# Patient Record
Sex: FEMALE | Race: OTHER | Employment: PART TIME | URBAN - METROPOLITAN AREA
[De-identification: names, ages, dates, MRNs, and addresses within clinical notes are randomized per-mention and may not be internally consistent; named-entity substitution may affect disease eponyms.]

---

## 2018-04-25 ENCOUNTER — HOSPITAL ENCOUNTER (EMERGENCY)
Age: 53
Discharge: HOME | End: 2018-04-26
Attending: EMERGENCY MEDICINE
Payer: MEDICAID

## 2018-04-25 ENCOUNTER — APPOINTMENT (OUTPATIENT)
Dept: GENERAL RADIOLOGY | Age: 53
End: 2018-04-25
Attending: EMERGENCY MEDICINE
Payer: MEDICAID

## 2018-04-25 DIAGNOSIS — M54.41 ACUTE RIGHT-SIDED LOW BACK PAIN WITH RIGHT-SIDED SCIATICA: Primary | ICD-10-CM

## 2018-04-25 PROCEDURE — 99284 EMERGENCY DEPT VISIT MOD MDM: CPT | Performed by: EMERGENCY MEDICINE

## 2018-04-25 PROCEDURE — 81003 URINALYSIS AUTO W/O SCOPE: CPT | Performed by: EMERGENCY MEDICINE

## 2018-04-25 PROCEDURE — 72100 X-RAY EXAM L-S SPINE 2/3 VWS: CPT

## 2018-04-25 RX ORDER — IBUPROFEN 800 MG/1
800 TABLET ORAL
Status: COMPLETED | OUTPATIENT
Start: 2018-04-25 | End: 2018-04-26

## 2018-04-25 NOTE — LETTER
[id number] [facility] EMERGENCY DEPT One [address] [id number] 
[phone] Work/School Note Date: 4/25/2018 To Whom It May concern: 
 
Jo Ann Allison was seen and treated today in the emergency room by the following provider(s): 
Attending Provider: Guerrero Harden MD. Jo Ann Allison Return to school/sport/work: 4/27/2018 Sincerely, Ana Maria Navarro RN

## 2018-04-26 VITALS
SYSTOLIC BLOOD PRESSURE: 124 MMHG | HEIGHT: 64 IN | RESPIRATION RATE: 18 BRPM | WEIGHT: 170 LBS | TEMPERATURE: 98.2 F | OXYGEN SATURATION: 97 % | DIASTOLIC BLOOD PRESSURE: 82 MMHG | HEART RATE: 68 BPM | BODY MASS INDEX: 29.02 KG/M2

## 2018-04-26 PROCEDURE — 74011250637 HC RX REV CODE- 250/637: Performed by: EMERGENCY MEDICINE

## 2018-04-26 RX ORDER — METHOCARBAMOL 500 MG/1
500 TABLET, FILM COATED ORAL
Qty: 10 TAB | Refills: 0 | Status: SHIPPED | OUTPATIENT
Start: 2018-04-26 | End: 2018-05-01

## 2018-04-26 RX ORDER — PREDNISONE 20 MG/1
60 TABLET ORAL DAILY
Qty: 12 TAB | Refills: 0 | Status: SHIPPED | OUTPATIENT
Start: 2018-04-26 | End: 2018-04-30

## 2018-04-26 RX ADMIN — IBUPROFEN 800 MG: 800 TABLET ORAL at 00:00

## 2018-04-26 NOTE — DISCHARGE INSTRUCTIONS
Aprenda sobre cómo tener kori espalda saludable - [ Learning About How to Have a Healthy Back ]  ¿Qué causa el dolor de espalda? El dolor de espalda a menudo es causado por uso excesivo, distensión o lesión. Por ejemplo, las personas frecuentemente se lastiman la espalda al practicar deportes o trabajar en el danieldín, al ser sacudidas en un accidente automovilístico o al levantar algo demasiado pesado. El envejecimiento también desempeña un papel. [place] y los músculos tienden a perder fuerza a medida que envejece, lo cual aumenta las probabilidades de [facility] Albino Demetris. Los discos [employer] huesos de la columna vertebral (vértebras) podrían tener desgaste y ya no proporcionar suficiente amortiguamiento entre los [place]. Un disco que sobresale o que se rompe (hernia de disco) puede presionar sobre los nervios, causando dolor de [place]. En Mirant, el dolor de espalda es el resultado de la artritis, de vértebras rotas debido a la pérdida de [employer] (osteoporosis), de kori enfermedad o de un problema de la columna vertebral.  Aunque la mayoría de las personas tienen dolor de espalda en un momento u otro, hay medidas que se pueden ward para reducir la probabilidad de sufrirlo. ¿Cómo puede tener kori espalda saludable? Reduzca la tensión en la espalda mediante kori buena postura  El desplomarse o encorvarse por sí solo caitlyn vez no cause dolor en la parte baja de la espalda. Jorge kori vez que la espalda se ha distendido o lesionado, la tom postura puede empeorar el dolor. · [place] en kori posición que mantenga las curvas naturales de la espalda y en un colchón cómodo. Duerma de lado con kori almohada entre las rodillas o boca arriba con kori almohada debajo de las rodillas. Estas posiciones pueden reducir la tensión en la espalda. · Manténgase erguido cuando esté de pie o sentado.  Tener kori \"buena postura\" por lo general significa que las [place], los hombros y las caderas están en Mark Bur recta.  · Si debe permanecer de pie mucho tiempo, ponga un pie sobre un taburete, un bordillo o Delmus Lockwood. Cambie de pie cada cierto tiempo. · Siéntese en kori silla que sea lo suficientemente baja maryellen para poner ambos pies en el suelo con las rodillas más o menos al nivel de la cadera. Si barragan silla o barragan escritorio son Agustina Coke, utilice un reposapiés con el fin de elevar las rodillas. Colóquese kori almohada pequeña, kori toalla enrollada o un rollo lumbar en la curva de la espalda si necesita más apoyo. · Pruebe usar kori silla ergonómica con apoyo para las rodillas (\"kneeling chair\"), pues ayuda a inclinar las caderas hacia sherrie. [place] le reduce la presión en la parte baja de la espalda. · Intente sentarse sobre kori pelota de ejercicio. Puede balancearse de lado a lado, lo que ayuda a mantener la espalda relajada. · Al conducir, [place]if[place]'s las rodillas adelso al nivel de las caderas. Siéntese derecho y conduzca con ambas imelda sobre el volante. Los brazos deben estar levemente flexionados. Reduzca la tensión en la espalda levantando objetos con cuidado  · Agáchese doblando solo la cadera y las rodillas. Si es necesario, ponga kori rodilla en el suelo y extienda la otra rodilla frente a usted en ángulo recto (genuflexión). · Empuje el pecho hacia adelante. [place] le ayuda a mantener la parte superior de la espalda derecha mientras mantiene un arco ligero en la parte baja. · Sostenga la carga tan cerca del cuerpo maryellen sea posible, a la altura del ombligo. · Utilice los pies para cambiar de dirección con pasos cortos. · Dirija con las caderas al cambiar de dirección. Mantenga los hombros en línea con las caderas [place] se desplaza. · Asiente la carga con cuidado, acuclillándose únicamente con las rodillas y las caderas. Ejercite y estire la espalda  · [place] algo de ejercicio la mayoría de los días de la [place], si barragan médico lo aprueba. Puede caminar, correr, nadar o montar en bicicleta.   · [employer] músculos de la espalda. Los siguientes son algunos ejercicios que puede probar:  ¨ Acuéstese de espalda y lleve suavemente kori rodilla flexionada hacia el pecho. Vuelva a poner emilia pie en el suelo y luego cuco lo mismo con la otra rodilla. ¨ Cuco inclinaciones de pelvis. Acuéstese de espalda con las rodillas flexionadas. Contraiga los músculos abdominales. Hunda el ombligo hacia adentro y [place], en dirección a las costillas. Deberá sentir maryellen si la espalda estuviera ejerciendo presión sobre el suelo y que las caderas y la pelvis se despegan levemente del suelo. Mantenga la posición isabel 6 segundos mientras respira de Wild[place] pausada. ¨ Siéntese con la espalda contra la pared. · Mantenga amauri los músculos del tronco. Los músculos de la espalda, el abdomen y los glúteos sostienen la columna vertebral.  ¨ Hunda el abdomen e imagine que está llevando el ombligo hacia la columna. Mantenga la posición isabel 6 segundos y luego relájese. Recuerde seguir respirando de manera normal [employer]. ¨ Cuco abdominales. Hágalos siempre con las rodillas dobladas. Mantenga la parte baja de la espalda sobre el suelo y [employer] hombros hacia las rodillas con un movimiento lento y uniforme. Mantenga los brazos cruzados sobre el pecho. Si esto le causa molestias en el henry, pruebe a poner las imelda detrás del henry (no de la onel), con los codos abiertos. ¨ Acuéstese boca arriba con las rodillas flexionadas y los pies apoyados sobre el suelo. Contraiga los músculos abdominales y luego empuje con los pies y eleve las nalgas algunas pulgadas. Mantenga la posición isabel 6 segundos mientras continúa respirando de Wild[place] normal y luego vuelva a bajar lentamente hacia el suelo. Repita de 8 a 12 veces. ¨ Si le gusta el ejercicio en christi, pruebe con Pilates o yoga.  Estas clases tienen posiciones que fortalecen los músculos del tronco.  Lleve un estilo de mayra saludable  · Mantenga un peso saludable para evitar sobrecargar la espalda. · No fume. Fumar aumenta el riesgo de osteoporosis, que debilita la columna vertebral. Si necesita ayuda para dejar de fumar, hable con barragan médico sobre programas y medicamentos para dejar de fumar. Estos pueden aumentar shivani probabilidades de dejar el hábito para siempre. ¿Dónde puede encontrar más información en inglés? Tracie Sterling a http://mitul-fauzia.info/. Escriba L315 en la búsqueda para aprender más acerca de \"Aprenda sobre cómo tener kori espalda saludable - [ Learning About How to Have a Healthy Back ]. \"  Revisado: 21 marzo, 2017  Versión del contenido: 11.4  © 1818-1431 Healthwise, Incorporated. Las instrucciones de cuidado fueron adaptadas bajo licencia por Good [employer] Connections (which disclaims liability or warranty for this information). Si usted tiene [place] afección médica o sobre estas instrucciones, siempre pregunte a barragan profesional de amanda. Healthwise, Incorporated niega toda garantía o responsabilidad por barragan uso de esta información. Dolor de espalda: Instrucciones de cuidado - [ Back Pain: Care Instructions ]  Instrucciones de cuidado    El dolor de espalda tiene muchas causas posibles. Con frecuencia, está relacionado con problemas en los músculos y ligamentos de la espalda. También podría estar relacionado con problemas de los nervios, discos o huesos de la espalda. Moverse, levantar algo, ponerse de pie, sentarse o dormir de [employer] incómoda pueden forzar la espalda. Algunas veces no se da cuenta de que tiene kori lesión Rohm and Aguilar tarde. La artritis es otra causa común del dolor de espalda. Aunque caitlyn vez duela mucho, el dolor de espalda por lo general mejora por sí mismo en varias semanas. [address] personas se recuperan en 12 semanas o menos. Hacer un buen tratamiento en el hogar y tener cuidado de no forzar la espalda puede ayudar a que se sienta mejor más pronto.   Dalia Terrell de seguimiento es kori parte clave de barragan tratamiento y seguridad. Asegúrese de hacer y acudir a todas las citas, y llame a barragan médico si está teniendo problemas. También es kori buena idea saber los resultados de los exámenes y mantener kori lista de los medicamentos que avelino. ¿Cómo puede cuidarse en el hogar? · Siéntese o acuéstese en las posiciones más cómodas y que reduzcan el dolor. Trate kori de estas posiciones al acostarse:  ¨ Acuéstese boca arriba con las rodillas dobladas y apoyadas sobre almohadones grandes. ¨ Acuéstese en el piso con las piernas sobre el asiento de un sofá o kori silla. ¨ Acuéstese de lado con las rodillas y caderas dobladas y [place] entre las piernas. ¨ Acuéstese boca abajo siempre que esta posición no empeore el dolor. · No se siente en la cama y evite los sofás blandos y las posiciones torcidas. El reposo en cama puede aliviar el dolor al principio, ciro retrasa la sanación. Evite reposar en la cama después del primer día de dolor de espalda. · Cambie de posición cada 30 minutos. Si debe sentarse por [employer], párese y descanse de estar sentado. Levántese y camine, o acuéstese en kori posición cómoda. · Pruebe usar kori almohadilla térmica a temperatura baja o mediana isabel 15 a 20 minutos cada 2 ó 3 horas. Pruebe kori ducha tibia en vez de kori sesión con la almohadilla térmica. · También puede probar kori compresa de hielo isabel 10 a 15 minutos cada 2 a 3 horas. Póngase un paño wall entre la compresa de hielo y la piel. · [employer] analgésicos (medicamentos para el dolor) exactamente según las indicaciones. ¨ Si el médico le recetó un analgésico, tómelo según las indicaciones. ¨ Si no está tomando un analgésico recetado, pregúntele a barragan médico si puede ward jena de [employer]. · Cuco caminatas cortas varias veces al día. Usted puede comenzar con 5 a 10 minutos, 3 ó 4 veces al día, y aumentar progresivamente hasta lograr caminatas más largas.  Camine en superficies elkin y evite colinas y escaleras hasta que la espalda esté mejor. · Regrese al [employer] y otras actividades lo más pronto posible. El descanso continuo sin actividad por lo general no es [place] para la espalda. · Para prevenir el dolor de espalda en el futuro, veronica ejercicios para estirar y fortalecer la espalda y el abdomen. Aprenda a Time Owens, técnicas seguras para levantar peso y la mecánica corporal apropiada. ¿Cuándo debe pedir ayuda? Llame a barragan médico ahora mismo o busque atención médica inmediata si:  ? · Tiene un nuevo entumecimiento en [place]. ? · Tiene un nuevo debilitamiento en [place]. ([place] puede hacer que sea difícil ponerse de pie). ? · Pierde el control de la vejiga o los intestinos. ?Preste especial atención a los cambios en barragan amanda y asegúrese de comunicarse con barragan médico si:  ? · El dolor [facility]. ? · No está mejorando después de 2 semanas. ¿Dónde puede encontrar más información en inglés? Jovita Mgcuire a http://mitul-fauzia.info/. Marcial [facility] F407 en la búsqueda para aprender Edwar Mary de \"Dolor de espalda: Instrucciones de cuidado - [ Back Pain: Care Instructions ]. \"  Revisado: 21 marzo, 2017  Versión del contenido: 11.4  © 0599-1349 Healthwise, Incorporated. Las instrucciones de cuidado fueron adaptadas bajo licencia por Good Help Connections (which disclaims liability or warranty for this information). Si usted tiene [place] afección médica o sobre estas instrucciones, siempre pregunte a barragan profesional de amanda. Healthwise, Incorporated niega toda garantía o responsabilidad por barragan uso de esta información. Aprenda sobre el alivio del dolor de espalda - [ Learning About Relief for Back Pain ]  ¿Qué son la tensión y la distensión en la espalda? La distensión en la espalda ocurre cuando usted estira [place], o fuerza, un músculo de la espalda.  Usted puede lesionarse la espalda en un accidente o cuando hace ejercicio o Almeta Abhishek algo.  La mayoría de los rodo de espalda mejoran con reposo y con [place]. Usted puede cuidarse en el hogar para ayudar a que barragan espalda sane. ¿Qué es lo saad que puede hacer para aliviar el dolor de espalda? Cuando empiece a sentir dolor de espalda, siga estos pasos:  · Camine. Dé un paseo corto (10 a 20 minutos) sobre kori superficie plana (sin pendientes, donde no haya colinas o escaleras) cada 2 o 3 horas. Solo camine distancias que pueda manejar sin dolor, especialmente dolor en las piernas. · Relájese. Encuentre kori posición cómoda para descansar. Algunas personas se sienten cómodas en el suelo o en kori cama de firmeza media con kori almohada pequeña debajo de la onel y otra debajo de las rodillas. Otras prefieren acostarse de lado con kori almohada entre las rodillas. No permanezca en kori posición por [employer]. · Pruebe con calor o hielo. Trate de [employer] almohadilla térmica a baja o media temperatura, o tome kori ducha tibia de 15 o 20 minutos cada 2 o 3 horas. O puede comprar vendas térmicas desechables que se usan kori maria teresa vez por hasta 8 horas. También puede probar compresas de hielo entre 10 y 15 minutos cada 2 o 3 horas. Puede usar kori compresa de hielo o kori bolsa de verduras congeladas envuelta en kori toalla delgada. No existe evidencia sólida de que el calor o el hielo ayuden, ciro puede probarlos para bhavin si son de [place]. También podría convenirle probar alternar [employer] frío y el calor. · [employer] analgésicos (medicamentos para el dolor) exactamente según las indicaciones. ¨ Si el médico le recetó un analgésico, tómelo según las indicaciones. ¨ Si no está tomando un analgésico recetado, pregúntele a barragan médico si puede ward jena de [employer]. ¿Qué más puede hacer? · Lucrezia Lager estiramientos y ejercicio. Los ejercicios que incrementan la flexibilidad pueden aliviar el dolor y facilitar que los músculos mantengan a la columna vertebral en kori buena posición neutra.  Y no se olvide de seguir caminando. · Hágase masajes usted mismo. Usted puede darse masaje para relajarse después del trabajo o de la escuela o para sentirse lleno de energía en la mañana. No es difícil [place] [employer], las imelda o el henry. El darse masaje usted mismo funciona mejor si está con ropa cómoda y sentado o [place] en kori posición cómoda. Utilice aceite o loción para masajearse la piel directamente. · Reduzca el estrés. El dolor de espalda puede llevar a un círculo leonidas: La angustia por el dolor tensa los músculos en la espalda, lo que a barragan vez causa más dolor. Aprenda a relajar la mente y los músculos para disminuir el estrés. ¿Dónde puede encontrar más información en inglés? Rosaura Wideman a http://mitulPeatix.info/. Kalpana Rogers K343 en la búsqueda para aprender más acerca de \"Aprenda sobre el [place] del dolor de espalda - [ Learning About Relief for Back Pain ]. \"  Revisado: 21 marzo, 2017  Versión del contenido: 11.4  © 6876-4970 Healthwise, Incorporated. Las instrucciones de cuidado fueron adaptadas bajo licencia por Good Help Connections (which disclaims liability or warranty for this information). Si usted tiene [place] afección médica o sobre estas instrucciones, siempre pregunte a barragan profesional de amanda. Healthwise, Incorporated niega toda garantía o responsabilidad por barragan uso de esta información. Dolor de espalda, síntomas urgentes o de emergencia: Instrucciones de cuidado - [ Back Pain, Emergency or Urgent Symptoms: Care Instructions ]  Instrucciones de cuidado    Muchas personas tienen dolor de espalda en algún momento. En la [employer], el dolor mejora con los cuidados personales, lo que incluye analgésicos (medicamentos para el dolor) de [employer], hielo, calor y ejercicios.   A menos que tenga síntomas de kori lesión grave o de ataque al corazón, es posible que pueda dejar pasar algunos días antes de llamar al médico. Jorge algunos problemas de espalda son Deneise Chatters graves. No ignore los síntomas que deberían ser revisados de inmediato. La atención de seguimiento es kori parte clave de barragan tratamiento y seguridad. Asegúrese de hacer y acudir a todas las citas, y llame a barragan médico si está teniendo problemas. También es kori buena idea saber los resultados de los exámenes y mantener kori lista de los medicamentos que avelino. ¿Cómo puede cuidarse en el hogar? · Siéntese o acuéstese en las posiciones más cómodas y que reduzcan el dolor. Pruebe kori de estas posiciones cuando se acueste:  ¨ Acuéstese boca arriba con las rodillas dobladas y apoyadas sobre [address]. ¨ Acuéstese en el piso con las piernas sobre el asiento de un sofá o kori silla. ¨ Acuéstese de lado con las rodillas [address] las caderas y [place] las piernas. ¨ Acuéstese boca abajo siempre que esta posición no empeore el dolor. · No se siente sobre la cama y evite los sillones blandos, así maryellen las posiciones torcidas. El reposo en cama puede aliviar el dolor al principio, ciro retrasa la sanación. Evite reposar en la cama después del primer día. · Cambie de posición cada 30 minutos. Si debe sentarse por [employer], párese y descanse de estar sentado. Levántese y camine, o acuéstese en posición horizontal.  · Pruebe a usar kori almohadilla térmica a temperatura baja o mediana de 15 a 20 minutos cada 2 o 3 horas. Pruebe con [place] ducha tibia en vez de kori sesión con la almohadilla térmica. También puede comprar envolturas calientes (\"heat wraps\") desechables que aragon hasta 8 horas. También puede tratar de colocarse hielo o compresas frías en la espalda de 10 a 20 minutos cada vez, varias veces al día. (Póngase un paño wall entre el hielo y la piel). [place] reduce el dolor y le será más fácil mantenerse activo y hacer ejercicio. · [employer] analgésicos exactamente según las indicaciones. ¨ Si el médico le recetó analgésicos, tómelos según las indicaciones.   ¨ Si no está tomando un analgésico recetado, pregúntele a barragan médico si puede ward jena de [employer]. ¿Cuándo debe pedir ayuda? Llame al 911 en cualquier momento que considere que necesita atención de [place]. Por ejemplo, llame si:  ? · Es completamente incapaz de  kori pierna. ? · Tiene dolor de espalda junto con dolor abdominal intenso. ? · Tiene síntomas de un ataque al corazón. Estos pueden incluir:  ¨ Dolor u opresión en el pecho, o kori sensación extraña en el pecho. ¨ Sudoración. ¨ Falta de aire. ¨ Náuseas o vómito. ¨ Dolor, opresión o kori sensación extraña en la espalda, el henry, la mandíbula o la parte superior del abdomen, o en jena o ambos hombros o brazos. ¨ Aturdimiento o debilidad repentina. ¨ Latidos del corazón rápidos o irregulares. Después de que llame al 911, el operador puede decirle que [place] [address] aspirina para adultos o de 2 a 4 aspirinas de dosis baja. Espere kori ambulancia. No trate de conducir usted mismo. ? Llame a barragan médico ahora mismo o busque atención médica inmediata si:  ? · Tiene síntomas nuevos o peores en los brazos, las piernas, el pecho, el abdomen o las nalgas (glúteos). Los síntomas pueden incluir:  ¨ Entumecimiento u hormigueo. ¨ Debilidad. ¨ Dolor. ? · Pierde el control de la vejiga o del intestino. ? · Tiene dolor de espalda y:  ¨ Se ha lesionado la espalda al levantar o al hacer alguna [place]. Llame si el dolor es intenso, no ha desaparecido después de 1 o 2 días y no puede hacer shivani actividades normales diarias. ¨ Anteriormente ha tenido kori lesión en la espalda que necesitó tratamiento. ¨ El dolor ha durado más de 4 [facility]. ¨ Dykes perdido peso de [place] inexplicable. ¨ Tiene [address]. ¨ Tiene o tuvo cáncer. ?Preste especial atención a los cambios en barragan amanda y asegúrese de comunicarse con barragan médico si no mejora maryellen se esperaba. ¿Dónde puede encontrar más información en inglés? Kasandra Deutsch a http://mitul-fauzia.info/.   Rosey Kerr U199 en la búsqueda para aprender más acerca de \"Dolor de espalda, síntomas urgentes o de emergencia: Instrucciones de cuidado - [ Back Pain, Emergency or Urgent Symptoms: Care Instructions ]. \"  Revisado: 20 Yuliya Briggs 2017  Versión del contenido: 11.4  © 0119-9718 Healthwise, Incorporated. Las instrucciones de cuidado fueron adaptadas bajo licencia por Good Help Connections (which disclaims liability or warranty for this information). Si usted tiene [place] afección médica o sobre estas instrucciones, siempre pregunte a barragan profesional de amanda. Healthwise, Incorporated niega toda garantía o responsabilidad por barragan uso de esta información. Dolor regulo en la parte baja de la espalda: Ejercicios - [ Acute Low Back Pain: Exercises ]  Instrucciones de cuidado  Éstos son algunos ejemplos de ejercicios típicos de rehabilitación para barragan afección. Comience cada ejercicio lentamente. Reduzca la intensidad del ejercicio si Lorrain Camera a sentir dolor. Barragan médico o el fisioterapeuta le dirán cuándo puede comenzar con estos ejercicios y cuáles funcionarán mejor para usted. Cuando no esté activo, encuentre kori posición cómoda para descansar. Algunas personas se sienten cómodas en el piso o en kori cama de firmeza media con kori almohada pequeña debajo de la onel y otra debajo de shivani rodillas. Otras personas prefieren acostarse de lado con kori almohada entre las rodillas. No permanezca en kori posición por [facility]. Dé paseos cortos (10 a 20 minutos) cada 2 a 3 horas. Evite las pendientes, las colinas y las escaleras hasta que se sienta mejor. Sólo camine distancias que pueda manejar sin dolor, especialmente dolor en las piernas. Cómo se hacen los ejercicios  Estiramientos de la espalda    1. [address] y las rodillas en el piso. 2. Relaje la onel y [facility]. Arquee la espalda hacia el techo, hasta que sienta que las partes yazmin, media y baja se estiran agradablemente.  Mantenga xenia estiramiento isabel el tiempo que se sienta cómodo, o de 15 a 30 segundos. 3. Vuelva a la posición inicial con la espalda plana mientras está apoyado de imelda y rodillas. 4. Deje que barragan espalda se nivele empujando el [place]. [address] (glúteos) hacia el techo. 5. Mantenga esta posición isabel 15 a 30 segundos. 6. Repita de 2 a 4 veces. La atención de seguimiento es kori parte clave de barragan tratamiento y seguridad. Asegúrese de hacer y acudir a todas las citas, y llame a barragan médico si está teniendo problemas. También es kori buena idea saber los resultados de los exámenes y mantener kori lista de los medicamentos que avelino. ¿Dónde puede encontrar más información en inglés? Mendez Silva a http://mitul-fauzia.info/. [facility] Tai R461 en la búsqueda para aprender más acerca de \"Dolor regulo en la parte baja de la espalda: Ejercicios - [ Acute Low Back Pain: Exercises ]. \"  Revisado: 21 marzo, 2017  Versión del contenido: 11.4  © 1930-7853 Healthwise, Incorporated. Las instrucciones de cuidado fueron adaptadas bajo licencia por Good [facility] Connections (which disclaims liability or warranty for this information). Si [facility] tiene [place] afección médica o sobre estas instrucciones, siempre pregunte a barragan profesional de amanda. Healthwise, Incorporated niega toda garantía o responsabilidad por barragan uso de esta información.

## 2018-04-26 NOTE — ED NOTES
I have reviewed discharge instructions with the patient. The patient verbalized understanding. Patient left ED via Discharge Method: ambulatory to Home with son Ruben Jade. Opportunity for questions and clarification provided. Patient given 2 scripts. To continue your aftercare when you leave the hospital, you may receive an automated call from our care team to check in on how you are doing. This is a free service and part of our promise to provide the best care and service to meet your aftercare needs.  If you have questions, or wish to unsubscribe from this service please call [phone]. Thank you for Choosing our [facility] Emergency Department.

## 2018-04-26 NOTE — ED TRIAGE NOTES
Pt arrived via POV with c/o LBP into Rt leg x 2 weeks, denies trauma; new onset. Came in tonight d/t unbearable pain.

## 2018-04-26 NOTE — ED PROVIDER NOTES
HPI Comments: 70-year-old female presented with complaint of right-sided lower back pain with radiation down her right leg has been present over the course of the past 2 weeks. States the pain is shooting in nature. Patient denies any recent trauma or injury. Denies numbness, tingling, trouble walking, fever, chills, IVDU, hx of malignancy, bowel or bladder incontinence, weakness. States that she attempted to smoke marijuana this evening to help the pain. Patient is a 47 y.o. female presenting with back pain. The history is provided by the patient. The history is limited by a language barrier. A  was used (Offered Language line or other interpretor. Family and patient states they would rather interpret. ). Back Pain    This is a new problem. The current episode started more than 2 days ago. The problem has not changed since onset. The problem occurs daily. Patient reports not work related injury. The pain is associated with no known injury. The pain is present in the lumbar spine. The quality of the pain is described as shooting. Radiates to: RLE. The pain is at a severity of 2/10. The pain is mild. The pain is the same all the time. Pertinent negatives include no chest pain, no fever, no numbness, no weight loss, no headaches, no abdominal pain, no abdominal swelling, no bowel incontinence, no perianal numbness, no bladder incontinence, no dysuria, no paresthesias, no paresis and no weakness. She has tried analgesics (Marijuana ) for the symptoms. The treatment provided no relief.         Past Medical History:   Diagnosis Date    Anxiety     Depression     HPV (human papilloma virus) anogenital infection     Hypercholesterolemia     Hypertension        Past Surgical History:   Procedure Laterality Date    HX TUBAL LIGATION           Family History:   Problem Relation Age of Onset    Diabetes Mother     Elevated Lipids Mother     Hypertension Mother     Heart Disease Father    [facility] Elevated Lipids Father     Hypertension Father     Heart Disease Brother     Breast Cancer Neg Hx        Social History     Social History    Marital status:      Spouse name: N/A    Number of children: N/A    Years of education: N/A     Occupational History    Not on file. Social History Main Topics    Smoking status: Current Every Day Smoker     Packs/day: 0.25    Smokeless tobacco: Not on file    Alcohol use 0.0 oz/week     0 Standard drinks or equivalent per week      Comment: rarely    Drug use: Not on file    Sexual activity: Not on file     Other Topics Concern    Not on file     Social History Narrative         ALLERGIES: Peanut    Review of Systems   Constitutional: Negative for chills, fever and weight loss. Respiratory: Negative for shortness of breath. Cardiovascular: Negative for chest pain. Gastrointestinal: Negative for abdominal pain, bowel incontinence, nausea and vomiting. Genitourinary: Negative for bladder incontinence, dysuria, flank pain and hematuria. Musculoskeletal: Positive for back pain. Negative for gait problem, myalgias, neck pain and neck stiffness. Skin: Negative for rash. Neurological: Negative for dizziness, syncope, weakness, numbness, headaches and paresthesias. Vitals:    04/25/18 2224   BP: 139/83   Pulse: 73   Resp: 20   Temp: 98.2 °F (36.8 °C)   SpO2: 97%   Weight: 77.1 kg (170 lb)   Height: 5' 4\" (1.626 m)            Physical Exam   Constitutional: She is oriented to person, place, and time. Patient sitting up in bed laughing with family. HENT:   Head: Normocephalic and atraumatic. Mouth/Throat: Oropharynx is clear and moist.   Eyes: Conjunctivae and EOM are normal. Pupils are equal, round, and reactive to light. Neck: Normal range of motion. No JVD present. No tracheal deviation present. Cardiovascular: Normal rate, regular rhythm, normal heart sounds and intact distal pulses.     Pulmonary/Chest: Effort normal and breath sounds normal.   CTAB. Abdominal: Soft. There is no tenderness. There is no rebound and no guarding. Musculoskeletal: Normal range of motion. She exhibits no edema, tenderness or deformity. No midline T-spine or L-spine tenderness to palpation. No step-off. No deformity. Negative straight leg raise test bilaterally. Neurological: She is alert and oriented to person, place, and time. No cranial nerve deficit. Coordination normal.   Strength 5 out of 5 throughout. Normal sensory exam.  Normal gait. No saddle anesthesia. Skin: No rash noted. No erythema. Nursing note and vitals reviewed. MDM  Number of Diagnoses or Management Options  Acute right-sided low back pain with right-sided sciatica: new and requires workup  Diagnosis management comments: XR L spine negative. UA negative for UTI. Vital signs stable. Patient well-appearing. Neuro exam intact. Patient with normal gait. No saddle anesthesia. Will discharge him with steroid taper and pain control. Instructed to follow-up with primary care physician. Amount and/or Complexity of Data Reviewed  Tests in the radiology section of CPT®: ordered and reviewed  Review and summarize past medical records: yes  Independent visualization of images, tracings, or specimens: yes    Risk of Complications, Morbidity, and/or Mortality  Presenting problems: low  Diagnostic procedures: minimal  Management options: minimal    Patient Progress  Patient progress: stable        ED Course   Comment By Time   XR L spine IMPRESSION:    1. No acute fracture or dislocation in the lumbar spine.  Franky Wong MD 04/26 0110       Procedures

## 2018-05-09 ENCOUNTER — HOSPITAL ENCOUNTER (OUTPATIENT)
Dept: MRI IMAGING | Age: 53
Discharge: HOME | End: 2018-05-09
Attending: FAMILY MEDICINE
Payer: MEDICAID

## 2018-05-09 DIAGNOSIS — M54.41 ACUTE RIGHT-SIDED LOW BACK PAIN WITH RIGHT-SIDED SCIATICA: ICD-10-CM

## 2018-05-09 PROCEDURE — 72148 MRI LUMBAR SPINE W/O DYE: CPT

## 2018-05-30 PROBLEM — M54.50 LOW BACK PAIN: Status: ACTIVE | Noted: 2018-05-30

## 2018-05-30 PROBLEM — M48.061 SPINAL STENOSIS OF LUMBAR REGION: Status: ACTIVE | Noted: 2018-05-30

## 2018-06-15 ENCOUNTER — APPOINTMENT (OUTPATIENT)
Dept: PHYSICAL THERAPY | Age: 53
End: 2018-06-15
Payer: MEDICAID

## 2018-06-18 ENCOUNTER — HOSPITAL ENCOUNTER (OUTPATIENT)
Dept: PHYSICAL THERAPY | Age: 53
Discharge: HOME | End: 2018-06-18
Payer: MEDICAID

## 2018-06-18 PROCEDURE — 97161 PT EVAL LOW COMPLEX 20 MIN: CPT

## 2018-06-18 NOTE — THERAPY EVALUATION
Mary Juares  : 1965  Payor: [address] / Plan: [address] / Product Type: Medicaid /  [address]  [facility] [address] [facility], [place] [address] [address] [facility]  [phone]   [phone]                OUTPATIENT PHYSICAL THERAPY:Initial Assessment 2018    ICD-10: Treatment Diagnosis:        Lumbago with sciatica, right side (M54.41)    Lumbago with sciatica, left side (M54.42)      PRECAUTIONS/ALLERGIES:   Peanut     FALL RISK SCORE: 1 (? 5 = High Risk)    MD ORDERS: Eval and Treat  MEDICAL/REFERRING DIAGNOSIS:  Spinal stenosis, lumbar region without neurogenic claudication [M48.061]  Low back pain [M54.5]    DATE OF ONSET: >2 months ago    REFERRING PHYSICIAN: Christin Stockton    RETURN PHYSICIAN APPOINTMENT: 2018     INITIAL ASSESSMENT:  Ms. Mary Juares has attended 1 physical therapy session including initial evaluation as of 18. Mary Juares exhibits decreased B flexibility, increased low back/leg pain with all movements (extension worse than flexion), decreased postural and core strength, decreased functional tolerance, and decreased general LE strength especially ER muscle group. Pt displayed slight R iliac upslip in sitting with no rotation noted. Leg length equal in supine and long sitting. Pt TTP throughout entire low back and B gluteal region (R>L) and painful L4/L5 PA glide. Pt displayed significant antalgic gait pattern with B lateral trunk lean during gait. Pt symptoms very irritable but indicative of spinal stenosis. Mary Juares will benefit from skilled PT (medically necessary) to address above deficits affecting participation in basic ADLs and overall functional tolerance.   Manual techniques (stretching, joint mobilizations, soft tissue mobilization/myofascial release), postural exercises/education, therapeutic techniques/activities, and HEP will be performed as appropriate addressing Ame Smallwood's current condition. PROBLEM LIST (Impacting functional limitations):  1. Decreased Strength  2. Decreased ADL/Functional Activities  3. Decreased Transfer Abilities  4. Decreased Ambulation Ability/Technique  5. Decreased Balance  6. Increased Pain  7. Decreased Activity Tolerance  8. Increased Fatigue  9. Increased Shortness of Breath  10. Decreased Flexibility/Joint Mobility  11. Decreased Bruce with Home Exercise Program INTERVENTIONS PLANNED:  1. Balance Exercise  2. Bed Mobility  3. Cold  4. Cryotherapy  5. Electrical Stimulation  6. Family Education  7. Gait Training  8. Heat  9. Home Exercise Program (HEP)  10. Manual Therapy  11. Neuromuscular Re-education/Strengthening  12. Range of Motion (ROM)  13. Therapeutic Activites  14. Therapeutic Exercise/Strengthening  15. Transfer Training  16. Mechanical traction  17. Aquatic Therapy   TREATMENT PLAN:  Effective Dates: 6/18/2018 TO 9/16/2018 (90 days). Frequency/Duration: 2 times a week for 90 Days    GOALS: (Goals have been discussed and agreed upon with patient.)  Long Term Goals 12 weeks   1. Nicolasa Knowles will demonstrate an 15 point improvement on the Oswestry to show improvement in function. 2. Nicolasa Knowles will report 0/10 pain during ADLs to improve QOL. [address] [place] [address] Aimeebogdan Esquivel will demonstrate >=4+/5 LE strength on manual muscle testing to improve functional mobility. [address] [place] [address] Aimee Alvin will be able to perform SLS >15 seconds bilaterally to help with gait and improve balance  5. Nicolasa Knowles will be able to demonstrate safe lifting and transfer mechanics without cueing for improved safety with home, childcare, and community activities.     Rehabilitation Potential For Stated Goals: Good    Regarding Ame Smallwood's therapy, I certify that the treatment plan above will be carried out by a therapist or under their direction. Thank you for this referral,  Mihai Locke, PT, DPT       Referring Physician Signature: Cornelius Richardson MA              Date                    HISTORY:   PATIENT GOAL FOR PHYSICAL THERAPY:   Pt would like to walk without pain. HISTORY OF PRESENT INJURY/ILLNESS (REASON FOR REFERRAL):  Pt states the pain started as a small discomfort in her lower back which gradually started getting worse and began traveling down her leg. Pt reports the pain is also burning in her groin region. Pt denies any trauma and reports it came on suddenly. Pt states she is currently not working due to symptoms. Pt reports she has been taking hydrocodone and various other medications but nothing seems to helping very much with the pain. Pt reports the medications are not working and she is beginning to feel depressed. Pt states she occasionally uses ice but it does not calm symptoms down. Pt reports the pain is the worst in her R leg but L side can also get very bad. Pt reports she is having the most difficulty getting up from seated position, walking, rolling over in bed, ascending/descending stairs, standing for long periods of time, bending over, and lifting heavy objects. Note: Patient denies any increase of symptoms with cough, sneeze or valsalva. Patient denies any saddle paresthesia or bowel/bladder deficits. PAST MEDICAL HISTORY/COMORBIDITIES:   Ms. Alba Holguin  has a past medical history of Anxiety; Depression; HPV (human papilloma virus) anogenital infection; Hypercholesterolemia; and Hypertension. Ms. Alba Holguin  has a past surgical history that includes hx tubal ligation. SOCIAL HISTORY/LIVING ENVIRONMENT:    Pt reports she is not currently working due to the pain. 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  Pt states she lives at home with family member. Social History     Social History    Marital status:      Spouse name: N/A    Number of children: N/A    Years of education: N/A     Occupational History    Not on file. Social History Main Topics    Smoking status: Current Every Day Smoker     Packs/day: 0.25    Smokeless tobacco: Never Used    Alcohol use 0.0 oz/week     0 Standard drinks or equivalent per week      Comment: rarely    Drug use: Not on file    Sexual activity: Not on file     Other Topics Concern    Not on file     Social History Narrative       PRIOR LEVEL OF FUNCTION/WORK/ACTIVITY:  Pt reports prior to onset of pain she was able to perform all activities without difficulties. Active Ambulatory Problems     Diagnosis Date Noted    Depression     Hypertension     HPV (human papilloma virus) anogenital infection     Anxiety     Environmental allergies 02/09/2016    Hypercholesterolemia     Spinal stenosis of lumbar region 05/30/2018    Low back pain 05/30/2018     Resolved Ambulatory Problems     Diagnosis Date Noted    Hyperlipidemia 03/14/2016     Past Medical History:   Diagnosis Date    Anxiety     Depression     HPV (human papilloma virus) anogenital infection     Hypercholesterolemia     Hypertension      CURRENT MEDICATIONS:    Current Outpatient Prescriptions:     HYDROcodone-acetaminophen (NORCO)  mg tablet, Take 1 Tab by mouth every eight (8) hours as needed for Pain for up to 30 days. Max Daily Amount: 3 Tabs. Indications: Pain, Disp: 90 Tab, Rfl: 0    cyclobenzaprine (FLEXERIL) 10 mg tablet, Take 1 Tab by mouth three (3) times daily as needed for Muscle Spasm(s). , Disp: 30 Tab, Rfl: 0    naproxen (NAPROSYN) 500 mg tablet, Take 1 Tab by mouth two (2) times daily (with meals). , Disp: 60 Tab, Rfl: 0    FLUoxetine (PROZAC) 40 mg capsule, TAKE ONE CAPSULE BY MOUTH ONE TIME DAILY, Disp: 90 Cap, Rfl: 3    atorvastatin (LIPITOR) 20 mg tablet, Take 1 Tab by mouth daily. , Disp: 30 Tab, Rfl: 11    buPROPion XL (WELLBUTRIN XL) 150 mg tablet, Take 1 Tab by mouth daily (after breakfast) for 30 days. , Disp: 30 Tab, Rfl: 11    lisinopril (PRINIVIL, ZESTRIL) 5 mg tablet, Take 1 Tab by mouth daily. , Disp: 90 Tab, Rfl: 3    hydroCHLOROthiazide (HYDRODIURIL) 25 mg tablet, Take 1 Tab by mouth daily. , Disp: 90 Tab, Rfl: 3     Date Last Reviewed:  6/18/2018   Number of Personal Factors/Comorbidities that affect the Plan of Care: 0: LOW COMPLEXITY   EXAMINATION:   OBSERVATION/ORTHOSTATIC POSTURAL ASSESSMENT:         Pt sits with forward head and rounded shoulders which indicate tight anterior chest musculature, upper trapezius, and levator scapula and weak posterior scapula musculature and deep cervical flexors. Pt displays decreased core motor control indicating weak core and low back musculature.   -Pt sits with equal distribution of weight throughout B hips.    -Increased lumbar lordosis in supine and standing. PALPATION:   -L iliac upslip in sitting. Neutral in supine and long sitting. -TTP: B SI joint, B lumbar paraspinals, B TFL, B glutes, B piriformis, B hamstrings.  Increased tone noted throughout R gluteal region and piriformis.   -Painful PA glide L4/L5     AROM/PROM:   AROM/PROM         Joint: Date: 6/18/18  Date:  Date:    Active LE ROM Right Left Right Left Right Left   Hip Flexion [facility]       Hip Extension [facility]       Knee Extension [facility]       Knee Flexion [facility] WFL       Knee Extension [facility] WF       Lumbar Flexion 55 degrees  -slight pain in legs --       Lumbar Extension 5 degrees  -extreme pain in back and legs --       Lumbar Side-Bending 18 degrees 15 degrees       Lumbar Rotation WFL  -Pain in R side at end range [facility]  -Pain in R side at end range         STRENGTH         Joint: Date: 6/18/18  Date:  Date:     Right Left Right Left Right Left   Hip Abduction 4/5  -Pain in R gluteal region 4/5         Hip Adduction 4/5 4/5       Hip IR 4/5 4/5       Hip ER 4/5 4/5 Hip Flexion 4/5 4-/5       Knee Extension 4+/5 4+/5       Knee Flexion 4+/5 4+/5       Ankle DF 4+/5 4+/5       Ankle PF 4+/5 4+/5       Ankle IV 4+/5 4+/5       Ankle EV 4+/5 4+/5           SPECIAL TESTS: Assessed @ Initial Visit    -90/90:    -R: 138 degrees    -L: 135 degrees   -SLR: Positive R @ 45 degrees   -SI POST. GAPPING: Positive B   -AMBROSE 4: Positive for tightness B. Pain reported in B ERs.   -SLUMP TEST: Positive R. Positive for tightness B.   -LUMBAR STENOSIS:       -Bilateral symptoms: Yes      -Leg pain more than back pain: Yes      -Pain during walking/standing: Yes      -Pain relief upon sitting: Yes      -Age greater than 48 years: Yes    NEUROLOGICAL SCREEN: Assessed @ Initial Visit    -RADIATING SYMPTOMS: YES.  Shooting pain into B LEs (R>L)     -DERMATOMES: Normal    -MYOTOMES Date: 6/18/18  Date:  Date:     Right Left Right Left Right Left   L2 & L3   (Hip Flexors) 4/5 4/5       L3-L4  (Knee Extensors) 5/5 5/5       L4  (Ankle DFs) 5/5 5/5       L5  (Hallux Ext) 5/5 5/5       L5-S1  (Ankle PFs) 5/5 5/5       S1-S2  (Ankle EVs) 5/5 5/5         -REFLEXES: Date: 6/18/18  Date:  Date:     Right Left Right Left Right Left   L4  (Quadriceps) NT NT       S1  (Achilles) NT NT         RED FLAGS: Date: 6/18/18 Date:  Date:   Non-Mechanical pain distribution (cannot be produced, changed, or reduced during exam): NO     Cauda Equina Dysfunction: NO     Upper lumbar disc herniation in younger patients (femoral nerve tension test for lateral disc herniation in lower lumbar): NO     Lumbar compression fracture (age > 48, trauma, corticosteroid use NO     Spine Cancer (age > 48, pervious history of cancer, failure to improve in 1 month of therapy, no relief - be rest, duration > 1 month, unexplained weight loss, insidious onset, constitutional symptoms): NO     Ankylosing Spondylitis (age < 36, pain not relieved by supine, morning back stiffness, pain duration > 3 months, improved by exercise): NO Sacral Fracture: NO       FUNCTIONAL MOBILITY:  Assessed @ Initial Visit    -Affecting participation in basic ADLs and functional tasks.   -Limited tolerance of walking and standing   -Ambulation/Gait: Pt ambulates with decreased hip flexion, wide LUCERO, B lateral trunk lean, decreased stance time on R LE. -Bed mobility: Increased difficulty rolling over in bed   -Stairs: Unable to perform due to increased pain. -Transfers: Mod I   -Wheelchair: N/A    BALANCE:   SLS: Date: 6/18/18 Date: Date:   Right: 4s  -pain in sacrum     Left: 3s  -pain in sacrum          Body Structures Involved:  1. Bones  2. Joints  3. Muscles  4. Ligaments Body Functions Affected:  1. Sensory/Pain  2. Neuromusculoskeletal  3. Movement Related Activities and Participation Affected:  1. Mobility  2. Self Care  3. Domestic Life  4. Interpersonal Interactions and Relationships  5. Community, Social and Conception [place]   Number of elements that affect the Plan of Care: 4+: HIGH COMPLEXITY   CLINICAL PRESENTATION:   Presentation: Evolving clinical presentation with changing clinical characteristics: MODERATE COMPLEXITY   CLINICAL DECISION MAKING:   OUTCOME MEASURE USED:   Tool Used: Tool Used: Modified Oswestry Low Back Pain Questionnaire  Score:  Initial: 24/50  Most Recent: X/50 (Date: -- )   Interpretation of Score: Each section is scored on a 0-5 scale, 5 representing the greatest disability. The scores of each section are added together for a total score of 50. Score 0 1-10 11-20 21-30 31-40 41-49 50   Modifier CH CI CJ CK CL CM CN     Payor: [place] / Plan: [place] / Product Type: Medicaid /     MEDICAL NECESSITY:  · Skilled intervention continues to be required due to above deficits affecting participation in basic ADLs and overall functional tolerance.     REASON FOR SERVICES/OTHER COMMENTS:  · Patient continues to require skilled intervention due to  above deficits affecting participation in basic ADLs and overall functional tolerance. Use of outcome tool(s) and clinical judgement create a POC that gives a: Questionable prediction of patient's progress: MODERATE COMPLEXITY   TREATMENT:   (In addition to Assessment/Re-Assessment sessions the following treatments were rendered)  THERAPEUTIC EXERCISE: (0 minutes):  Exercises per grid below to improve mobility, strength and balance. Required minimal visual and verbal cues to promote proper body alignment and promote proper body posture. Progressed resistance, range and complexity of movement as indicated. Date:   Date:   Date:     Activity/Exercise Parameters Parameters Parameters                                               MANUAL THERAPY: (0 minutes): Joint mobilization, Soft tissue mobilization and Manipulation was utilized and necessary because of the patient's restricted joint motion, painful spasm, loss of articular motion and restricted motion of soft tissue. (Used abbreviations: MET - muscle energy technique; PNF - proprioceptive neuromuscular facilitation; NMR - neuromuscular re-education; AP - anterior to posterior; PA - posterior to anterior)    MODALITIES: (0 minutes):      NOT TODAY        TREATMENT/SESSION ASSESSMENT:  Vladimir Grande verbalized understanding of role of PT and POC. · Pain/ Symptoms: Initial:   7/10 Post Session:  7/10     · Compliance with Program/Exercises: Will assess as treatment progresses. · Recommendations/Intent for next treatment session: \"Next visit will focus on advancements to more challenging activities\". Total Treatment Duration:  PT Patient Time In/Time Out  Time In: 0925  Time Out: 8146 [address].  Patrick Kim DPT

## 2018-06-18 NOTE — PROGRESS NOTES
Ambulatory/Rehab Services H2 Model Falls Risk Assessment    Risk Factor Pts. ·   Confusion/Disorientation/Impulsivity  []    4 ·   Symptomatic Depression  []   2 ·   Altered Elimination  []   1 ·   Dizziness/Vertigo  []   1 ·   Gender (Male)  []   1 ·   Any administered antiepileptics (anticonvulsants):  []   2 ·   Any administered benzodiazepines:  []   1 ·   Visual Impairment (specify):  []   1 ·   Portable Oxygen Use  []   1 ·   Orthostatic ? BP  []   1 ·   History of Recent Falls (within 3 mos.)  []   5     Ability to Rise from Chair (choose one) Pts. ·   Ability to rise in a single movement  []   0 ·   Pushes up, successful in one attempt  [x]   1 ·   Multiple attempts, but successful  []   3 ·   Unable to rise without assistance  []   4   Total: (5 or greater = High Risk) 1     Falls Prevention Plan:   []                Physical Limitations to Exercise (specify):   []                Mobility Assistance Device (type):   []                Exercise/Equipment Adaptation (specify):    ©2010 [facility] Patent #[id number].  Federal Law prohibits the replication, distribution or use without written permission from [facility] [employer]

## 2018-07-12 PROBLEM — M43.16 SPONDYLOLISTHESIS AT L4-L5 LEVEL: Status: ACTIVE | Noted: 2018-07-12

## 2018-07-12 PROBLEM — M48.062 LUMBAR STENOSIS WITH NEUROGENIC CLAUDICATION: Status: ACTIVE | Noted: 2018-05-30

## 2018-07-23 ENCOUNTER — HOSPITAL ENCOUNTER (OUTPATIENT)
Dept: SURGERY | Age: 53
Discharge: HOME | End: 2018-07-23
Payer: COMMERCIAL

## 2018-07-23 VITALS
DIASTOLIC BLOOD PRESSURE: 92 MMHG | HEIGHT: 62 IN | WEIGHT: 163.06 LBS | OXYGEN SATURATION: 98 % | SYSTOLIC BLOOD PRESSURE: 143 MMHG | HEART RATE: 73 BPM | BODY MASS INDEX: 30.01 KG/M2 | RESPIRATION RATE: 18 BRPM | TEMPERATURE: 98.3 F

## 2018-07-23 LAB
ANION GAP SERPL CALC-SCNC: 6 MMOL/L (ref 7–16)
APPEARANCE UR: CLEAR
BACTERIA SPEC CULT: ABNORMAL
BASOPHILS # BLD: 0.1 K/UL (ref 0–0.2)
BASOPHILS NFR BLD: 1 % (ref 0–2)
BILIRUB UR QL: NEGATIVE
BUN SERPL-MCNC: 12 MG/DL (ref 6–23)
CALCIUM SERPL-MCNC: 9.4 MG/DL (ref 8.3–10.4)
CHLORIDE SERPL-SCNC: 102 MMOL/L (ref 98–107)
CO2 SERPL-SCNC: 30 MMOL/L (ref 21–32)
COLOR UR: YELLOW
CREAT SERPL-MCNC: 0.54 MG/DL (ref 0.6–1)
DIFFERENTIAL METHOD BLD: NORMAL
EOSINOPHIL # BLD: 0.2 K/UL (ref 0–0.8)
EOSINOPHIL NFR BLD: 2 % (ref 0.5–7.8)
ERYTHROCYTE [DISTWIDTH] IN BLOOD BY AUTOMATED COUNT: 13.2 % (ref 11.9–14.6)
GLUCOSE SERPL-MCNC: 91 MG/DL (ref 65–100)
GLUCOSE UR STRIP.AUTO-MCNC: NEGATIVE MG/DL
HCT VFR BLD AUTO: 43.2 % (ref 35.8–46.3)
HGB BLD-MCNC: 14.3 G/DL (ref 11.7–15.4)
HGB UR QL STRIP: NEGATIVE
IMM GRANULOCYTES # BLD: 0 K/UL (ref 0–0.5)
IMM GRANULOCYTES NFR BLD AUTO: 0 % (ref 0–5)
KETONES UR QL STRIP.AUTO: NEGATIVE MG/DL
LEUKOCYTE ESTERASE UR QL STRIP.AUTO: NEGATIVE
LYMPHOCYTES # BLD: 2.5 K/UL (ref 0.5–4.6)
LYMPHOCYTES NFR BLD: 33 % (ref 13–44)
MCH RBC QN AUTO: 29.5 PG (ref 26.1–32.9)
MCHC RBC AUTO-ENTMCNC: 33.1 G/DL (ref 31.4–35)
MCV RBC AUTO: 89.3 FL (ref 79.6–97.8)
MONOCYTES # BLD: 0.5 K/UL (ref 0.1–1.3)
MONOCYTES NFR BLD: 6 % (ref 4–12)
NEUTS SEG # BLD: 4.4 K/UL (ref 1.7–8.2)
NEUTS SEG NFR BLD: 58 % (ref 43–78)
NITRITE UR QL STRIP.AUTO: NEGATIVE
PH UR STRIP: 6 [PH] (ref 5–9)
PLATELET # BLD AUTO: 334 K/UL (ref 150–450)
PMV BLD AUTO: 11 FL (ref 10.8–14.1)
POTASSIUM SERPL-SCNC: 3.7 MMOL/L (ref 3.5–5.1)
PROT UR STRIP-MCNC: NEGATIVE MG/DL
RBC # BLD AUTO: 4.84 M/UL (ref 4.05–5.25)
SERVICE CMNT-IMP: ABNORMAL
SODIUM SERPL-SCNC: 138 MMOL/L (ref 136–145)
SP GR UR REFRACTOMETRY: 1.01 (ref 1–1.02)
UROBILINOGEN UR QL STRIP.AUTO: 0.2 EU/DL (ref 0.2–1)
WBC # BLD AUTO: 7.7 K/UL (ref 4.3–11.1)

## 2018-07-23 PROCEDURE — 81003 URINALYSIS AUTO W/O SCOPE: CPT | Performed by: NEUROLOGICAL SURGERY

## 2018-07-23 PROCEDURE — 77030027138 HC INCENT SPIROMETER -A

## 2018-07-23 PROCEDURE — 87641 MR-STAPH DNA AMP PROBE: CPT | Performed by: NEUROLOGICAL SURGERY

## 2018-07-23 PROCEDURE — 85025 COMPLETE CBC W/AUTO DIFF WBC: CPT | Performed by: NEUROLOGICAL SURGERY

## 2018-07-23 PROCEDURE — 80048 BASIC METABOLIC PNL TOTAL CA: CPT | Performed by: NEUROLOGICAL SURGERY

## 2018-07-23 RX ORDER — NAPROXEN 500 MG/1
500 TABLET ORAL 2 TIMES DAILY WITH MEALS
COMMUNITY
End: 2018-11-16 | Stop reason: ALTCHOICE

## 2018-07-23 RX ORDER — DIPHENHYDRAMINE HCL 25 MG
25 CAPSULE ORAL
COMMUNITY

## 2018-07-23 NOTE — PERIOP NOTES
Mssa swab positive. Mupirocin 2% ointment called in to [facility]. Called patient to askl her to  medicine. Riterated instructions. Patient verbalized understanding.

## 2018-07-29 ENCOUNTER — ANESTHESIA EVENT (OUTPATIENT)
Dept: SURGERY | Age: 53
DRG: 304 | End: 2018-07-29
Payer: COMMERCIAL

## 2018-07-30 ENCOUNTER — ANESTHESIA (OUTPATIENT)
Dept: SURGERY | Age: 53
DRG: 304 | End: 2018-07-30
Payer: COMMERCIAL

## 2018-07-30 ENCOUNTER — HOSPITAL ENCOUNTER (INPATIENT)
Age: 53
LOS: 2 days | Discharge: HOME | DRG: 304 | End: 2018-08-01
Attending: NEUROLOGICAL SURGERY | Admitting: NEUROLOGICAL SURGERY
Payer: COMMERCIAL

## 2018-07-30 ENCOUNTER — APPOINTMENT (OUTPATIENT)
Dept: GENERAL RADIOLOGY | Age: 53
DRG: 304 | End: 2018-07-30
Attending: NEUROLOGICAL SURGERY
Payer: COMMERCIAL

## 2018-07-30 DIAGNOSIS — M43.16 SPONDYLOLISTHESIS, LUMBAR REGION: Primary | ICD-10-CM

## 2018-07-30 LAB
ABO + RH BLD: NORMAL
BLOOD GROUP ANTIBODIES SERPL: NORMAL
HCG UR QL: NEGATIVE
SPECIMEN EXP DATE BLD: NORMAL

## 2018-07-30 PROCEDURE — 77030014007 HC SPNG HEMSTAT J&J -B: Performed by: NEUROLOGICAL SURGERY

## 2018-07-30 PROCEDURE — C1713 ANCHOR/SCREW BN/BN,TIS/BN: HCPCS | Performed by: NEUROLOGICAL SURGERY

## 2018-07-30 PROCEDURE — 77030008477 HC STYL SATN SLP COVD -A: Performed by: ANESTHESIOLOGY

## 2018-07-30 PROCEDURE — 77030027138 HC INCENT SPIROMETER -A

## 2018-07-30 PROCEDURE — 0SG00AJ FUSION OF LUMBAR VERTEBRAL JOINT WITH INTERBODY FUSION DEVICE, POSTERIOR APPROACH, ANTERIOR COLUMN, OPEN APPROACH: ICD-10-PCS | Performed by: NEUROLOGICAL SURGERY

## 2018-07-30 PROCEDURE — 76210000006 HC OR PH I REC 0.5 TO 1 HR: Performed by: NEUROLOGICAL SURGERY

## 2018-07-30 PROCEDURE — 00BT0ZZ EXCISION OF SPINAL MENINGES, OPEN APPROACH: ICD-10-PCS | Performed by: NEUROLOGICAL SURGERY

## 2018-07-30 PROCEDURE — 74011250636 HC RX REV CODE- 250/636: Performed by: NEUROLOGICAL SURGERY

## 2018-07-30 PROCEDURE — 0ST20ZZ RESECTION OF LUMBAR VERTEBRAL DISC, OPEN APPROACH: ICD-10-PCS | Performed by: NEUROLOGICAL SURGERY

## 2018-07-30 PROCEDURE — 77030019940 HC BLNKT HYPOTHRM STRY -B: Performed by: ANESTHESIOLOGY

## 2018-07-30 PROCEDURE — 77030019557 HC ELECTRD VES SEAL MEDT -F: Performed by: NEUROLOGICAL SURGERY

## 2018-07-30 PROCEDURE — 0SG007J FUSION OF LUMBAR VERTEBRAL JOINT WITH AUTOLOGOUS TISSUE SUBSTITUTE, POSTERIOR APPROACH, ANTERIOR COLUMN, OPEN APPROACH: ICD-10-PCS | Performed by: NEUROLOGICAL SURGERY

## 2018-07-30 PROCEDURE — 74011000250 HC RX REV CODE- 250: Performed by: FAMILY MEDICINE

## 2018-07-30 PROCEDURE — 77030032490 HC SLV COMPR SCD KNE COVD -B: Performed by: NEUROLOGICAL SURGERY

## 2018-07-30 PROCEDURE — 77030038601 HC DEV SYS W/CANN LITE BIO STRY -F: Performed by: NEUROLOGICAL SURGERY

## 2018-07-30 PROCEDURE — 72100 X-RAY EXAM L-S SPINE 2/3 VWS: CPT

## 2018-07-30 PROCEDURE — 74011250637 HC RX REV CODE- 250/637: Performed by: NEUROLOGICAL SURGERY

## 2018-07-30 PROCEDURE — 77030014650 HC SEAL MTRX FLOSEL BAXT -C: Performed by: NEUROLOGICAL SURGERY

## 2018-07-30 PROCEDURE — 77030003029 HC SUT VCRL J&J -B: Performed by: NEUROLOGICAL SURGERY

## 2018-07-30 PROCEDURE — 77030030163 HC BN WAX J&J -A: Performed by: NEUROLOGICAL SURGERY

## 2018-07-30 PROCEDURE — 88304 TISSUE EXAM BY PATHOLOGIST: CPT | Performed by: NEUROLOGICAL SURGERY

## 2018-07-30 PROCEDURE — 079T3ZZ DRAINAGE OF BONE MARROW, PERCUTANEOUS APPROACH: ICD-10-PCS | Performed by: NEUROLOGICAL SURGERY

## 2018-07-30 PROCEDURE — 77030012406 HC DRN WND PENRS BARD -A: Performed by: NEUROLOGICAL SURGERY

## 2018-07-30 PROCEDURE — 77030013567 HC DRN WND RESERV BARD -A: Performed by: NEUROLOGICAL SURGERY

## 2018-07-30 PROCEDURE — 65270000029 HC RM PRIVATE

## 2018-07-30 PROCEDURE — 74011000250 HC RX REV CODE- 250

## 2018-07-30 PROCEDURE — 77030008771 HC TU NG SALEM SUMP -A: Performed by: ANESTHESIOLOGY

## 2018-07-30 PROCEDURE — 77030018390 HC SPNG HEMSTAT2 J&J -B: Performed by: NEUROLOGICAL SURGERY

## 2018-07-30 PROCEDURE — 77030034760 HC NDL BN MAR ASPIR JAMSH STRY -B: Performed by: NEUROLOGICAL SURGERY

## 2018-07-30 PROCEDURE — 81025 URINE PREGNANCY TEST: CPT

## 2018-07-30 PROCEDURE — 74011250636 HC RX REV CODE- 250/636: Performed by: ANESTHESIOLOGY

## 2018-07-30 PROCEDURE — 77030020782 HC GWN BAIR PAWS FLX 3M -B: Performed by: ANESTHESIOLOGY

## 2018-07-30 PROCEDURE — 74011250637 HC RX REV CODE- 250/637: Performed by: ANESTHESIOLOGY

## 2018-07-30 PROCEDURE — 74011000250 HC RX REV CODE- 250: Performed by: NEUROLOGICAL SURGERY

## 2018-07-30 PROCEDURE — 76060000036 HC ANESTHESIA 2.5 TO 3 HR: Performed by: NEUROLOGICAL SURGERY

## 2018-07-30 PROCEDURE — 77030008703 HC TU ET UNCUF COVD -A: Performed by: ANESTHESIOLOGY

## 2018-07-30 PROCEDURE — 77030034849: Performed by: NEUROLOGICAL SURGERY

## 2018-07-30 PROCEDURE — 74011250636 HC RX REV CODE- 250/636

## 2018-07-30 PROCEDURE — 77030012894: Performed by: NEUROLOGICAL SURGERY

## 2018-07-30 PROCEDURE — 77030011640 HC PAD GRND REM COVD -A: Performed by: NEUROLOGICAL SURGERY

## 2018-07-30 PROCEDURE — 77030019908 HC STETH ESOPH SIMS -A: Performed by: ANESTHESIOLOGY

## 2018-07-30 PROCEDURE — 74011250636 HC RX REV CODE- 250/636: Performed by: FAMILY MEDICINE

## 2018-07-30 PROCEDURE — 86900 BLOOD TYPING SEROLOGIC ABO: CPT | Performed by: ANESTHESIOLOGY

## 2018-07-30 PROCEDURE — 76010000172 HC OR TIME 2.5 TO 3 HR INTENSV-TIER 1: Performed by: NEUROLOGICAL SURGERY

## 2018-07-30 PROCEDURE — 77030018836 HC SOL IRR NACL ICUM -A: Performed by: NEUROLOGICAL SURGERY

## 2018-07-30 PROCEDURE — 01NB0ZZ RELEASE LUMBAR NERVE, OPEN APPROACH: ICD-10-PCS | Performed by: NEUROLOGICAL SURGERY

## 2018-07-30 PROCEDURE — 77030002916 HC SUT ETHLN J&J -A: Performed by: NEUROLOGICAL SURGERY

## 2018-07-30 PROCEDURE — 77030034475 HC MISC IMPL SPN: Performed by: NEUROLOGICAL SURGERY

## 2018-07-30 PROCEDURE — 77030020255 HC SOL INJ LR 1000ML BG

## 2018-07-30 DEVICE — 45MM ROD, PRE-LORDOSED
Type: IMPLANTABLE DEVICE | Site: SPINE LUMBAR | Status: FUNCTIONAL
Brand: FIREBIRD

## 2018-07-30 DEVICE — 5.5MM X 40MM CORTICAL BONE SCREW
Type: IMPLANTABLE DEVICE | Site: SPINE LUMBAR | Status: FUNCTIONAL
Brand: JANUS

## 2018-07-30 DEVICE — GRAFT BNE SUB 5CC 2MM GRAN ALLOGENIC MORPHOGENETIC PROT W: Type: IMPLANTABLE DEVICE | Site: SPINE LUMBAR | Status: FUNCTIONAL

## 2018-07-30 DEVICE — 5.5MM X 45MM CORTICAL BONE SCREW
Type: IMPLANTABLE DEVICE | Site: SPINE LUMBAR | Status: FUNCTIONAL
Brand: JANUS

## 2018-07-30 DEVICE — SET SCREW
Type: IMPLANTABLE DEVICE | Site: SPINE LUMBAR | Status: FUNCTIONAL
Brand: FIREBIRD NXG

## 2018-07-30 DEVICE — TOP LOADING BODY
Type: IMPLANTABLE DEVICE | Site: SPINE LUMBAR | Status: FUNCTIONAL
Brand: FIREBIRD NXG

## 2018-07-30 RX ORDER — OXYCODONE HYDROCHLORIDE 15 MG/1
15 TABLET ORAL
Status: DISCONTINUED | OUTPATIENT
Start: 2018-07-30 | End: 2018-08-01 | Stop reason: HOSPADM

## 2018-07-30 RX ORDER — SODIUM CHLORIDE, SODIUM LACTATE, POTASSIUM CHLORIDE, CALCIUM CHLORIDE 600; 310; 30; 20 MG/100ML; MG/100ML; MG/100ML; MG/100ML
75 INJECTION, SOLUTION INTRAVENOUS CONTINUOUS
Status: DISCONTINUED | OUTPATIENT
Start: 2018-07-30 | End: 2018-08-01 | Stop reason: HOSPADM

## 2018-07-30 RX ORDER — LIDOCAINE HYDROCHLORIDE 10 MG/ML
0.1 INJECTION INFILTRATION; PERINEURAL AS NEEDED
Status: DISCONTINUED | OUTPATIENT
Start: 2018-07-30 | End: 2018-08-01 | Stop reason: HOSPADM

## 2018-07-30 RX ORDER — SODIUM CHLORIDE 9 MG/ML
50 INJECTION, SOLUTION INTRAVENOUS CONTINUOUS
Status: DISCONTINUED | OUTPATIENT
Start: 2018-07-30 | End: 2018-07-30 | Stop reason: HOSPADM

## 2018-07-30 RX ORDER — CEFAZOLIN SODIUM/WATER 2 G/20 ML
2 SYRINGE (ML) INTRAVENOUS ONCE
Status: COMPLETED | OUTPATIENT
Start: 2018-07-30 | End: 2018-07-30

## 2018-07-30 RX ORDER — ACETAMINOPHEN 500 MG
1000 TABLET ORAL
Status: DISCONTINUED | OUTPATIENT
Start: 2018-07-30 | End: 2018-07-30 | Stop reason: HOSPADM

## 2018-07-30 RX ORDER — ACETAMINOPHEN 325 MG/1
650 TABLET ORAL
Status: DISCONTINUED | OUTPATIENT
Start: 2018-07-30 | End: 2018-08-01 | Stop reason: HOSPADM

## 2018-07-30 RX ORDER — NAPROXEN 250 MG/1
500 TABLET ORAL 2 TIMES DAILY WITH MEALS
Status: DISCONTINUED | OUTPATIENT
Start: 2018-07-30 | End: 2018-08-01 | Stop reason: HOSPADM

## 2018-07-30 RX ORDER — CEFAZOLIN SODIUM 1 G/3ML
INJECTION, POWDER, FOR SOLUTION INTRAMUSCULAR; INTRAVENOUS AS NEEDED
Status: DISCONTINUED | OUTPATIENT
Start: 2018-07-30 | End: 2018-07-30 | Stop reason: HOSPADM

## 2018-07-30 RX ORDER — PROPOFOL 10 MG/ML
INJECTION, EMULSION INTRAVENOUS AS NEEDED
Status: DISCONTINUED | OUTPATIENT
Start: 2018-07-30 | End: 2018-07-30 | Stop reason: HOSPADM

## 2018-07-30 RX ORDER — GLYCOPYRROLATE 0.2 MG/ML
INJECTION INTRAMUSCULAR; INTRAVENOUS AS NEEDED
Status: DISCONTINUED | OUTPATIENT
Start: 2018-07-30 | End: 2018-07-30 | Stop reason: HOSPADM

## 2018-07-30 RX ORDER — ONDANSETRON 2 MG/ML
INJECTION INTRAMUSCULAR; INTRAVENOUS AS NEEDED
Status: DISCONTINUED | OUTPATIENT
Start: 2018-07-30 | End: 2018-07-30 | Stop reason: HOSPADM

## 2018-07-30 RX ORDER — VANCOMYCIN HYDROCHLORIDE
1250 EVERY 12 HOURS
Status: COMPLETED | OUTPATIENT
Start: 2018-07-30 | End: 2018-08-01

## 2018-07-30 RX ORDER — SODIUM CHLORIDE 0.9 % (FLUSH) 0.9 %
5-10 SYRINGE (ML) INJECTION AS NEEDED
Status: DISCONTINUED | OUTPATIENT
Start: 2018-07-30 | End: 2018-08-01 | Stop reason: HOSPADM

## 2018-07-30 RX ORDER — ACETAMINOPHEN 10 MG/ML
1000 INJECTION, SOLUTION INTRAVENOUS
Status: COMPLETED | OUTPATIENT
Start: 2018-07-30 | End: 2018-07-30

## 2018-07-30 RX ORDER — NEOSTIGMINE METHYLSULFATE 1 MG/ML
INJECTION INTRAVENOUS AS NEEDED
Status: DISCONTINUED | OUTPATIENT
Start: 2018-07-30 | End: 2018-07-30 | Stop reason: HOSPADM

## 2018-07-30 RX ORDER — ATORVASTATIN CALCIUM 10 MG/1
20 TABLET, FILM COATED ORAL DAILY
Status: DISCONTINUED | OUTPATIENT
Start: 2018-07-31 | End: 2018-08-01 | Stop reason: HOSPADM

## 2018-07-30 RX ORDER — EPHEDRINE SULFATE 50 MG/ML
INJECTION, SOLUTION INTRAVENOUS AS NEEDED
Status: DISCONTINUED | OUTPATIENT
Start: 2018-07-30 | End: 2018-07-30 | Stop reason: HOSPADM

## 2018-07-30 RX ORDER — LISINOPRIL 5 MG/1
5 TABLET ORAL DAILY
Status: DISCONTINUED | OUTPATIENT
Start: 2018-07-31 | End: 2018-08-01 | Stop reason: HOSPADM

## 2018-07-30 RX ORDER — BUPROPION HYDROCHLORIDE 150 MG/1
150 TABLET, EXTENDED RELEASE ORAL 2 TIMES DAILY
Status: DISCONTINUED | OUTPATIENT
Start: 2018-07-30 | End: 2018-08-01 | Stop reason: HOSPADM

## 2018-07-30 RX ORDER — VANCOMYCIN 1.75 GRAM/500 ML IN 0.9 % SODIUM CHLORIDE INTRAVENOUS
1750 ONCE
Status: COMPLETED | OUTPATIENT
Start: 2018-07-30 | End: 2018-07-30

## 2018-07-30 RX ORDER — FAMOTIDINE 20 MG/1
20 TABLET, FILM COATED ORAL ONCE
Status: COMPLETED | OUTPATIENT
Start: 2018-07-30 | End: 2018-07-30

## 2018-07-30 RX ORDER — FLUOXETINE HYDROCHLORIDE 20 MG/1
40 CAPSULE ORAL DAILY
Status: DISCONTINUED | OUTPATIENT
Start: 2018-07-31 | End: 2018-08-01 | Stop reason: HOSPADM

## 2018-07-30 RX ORDER — HYDROMORPHONE HYDROCHLORIDE 2 MG/ML
1 INJECTION, SOLUTION INTRAMUSCULAR; INTRAVENOUS; SUBCUTANEOUS
Status: DISCONTINUED | OUTPATIENT
Start: 2018-07-30 | End: 2018-08-01 | Stop reason: HOSPADM

## 2018-07-30 RX ORDER — SODIUM CHLORIDE 0.9 % (FLUSH) 0.9 %
5-10 SYRINGE (ML) INJECTION EVERY 8 HOURS
Status: DISCONTINUED | OUTPATIENT
Start: 2018-07-30 | End: 2018-08-01 | Stop reason: HOSPADM

## 2018-07-30 RX ORDER — SODIUM CHLORIDE, SODIUM LACTATE, POTASSIUM CHLORIDE, CALCIUM CHLORIDE 600; 310; 30; 20 MG/100ML; MG/100ML; MG/100ML; MG/100ML
150 INJECTION, SOLUTION INTRAVENOUS CONTINUOUS
Status: DISCONTINUED | OUTPATIENT
Start: 2018-07-30 | End: 2018-07-30 | Stop reason: HOSPADM

## 2018-07-30 RX ORDER — HYDROCODONE BITARTRATE AND ACETAMINOPHEN 5; 325 MG/1; MG/1
1 TABLET ORAL AS NEEDED
Status: DISCONTINUED | OUTPATIENT
Start: 2018-07-30 | End: 2018-07-30 | Stop reason: HOSPADM

## 2018-07-30 RX ORDER — HYDROMORPHONE HYDROCHLORIDE 2 MG/ML
0.5 INJECTION, SOLUTION INTRAMUSCULAR; INTRAVENOUS; SUBCUTANEOUS
Status: DISCONTINUED | OUTPATIENT
Start: 2018-07-30 | End: 2018-07-30 | Stop reason: HOSPADM

## 2018-07-30 RX ORDER — FENTANYL CITRATE 50 UG/ML
INJECTION, SOLUTION INTRAMUSCULAR; INTRAVENOUS AS NEEDED
Status: DISCONTINUED | OUTPATIENT
Start: 2018-07-30 | End: 2018-07-30 | Stop reason: HOSPADM

## 2018-07-30 RX ORDER — MIDAZOLAM HYDROCHLORIDE 1 MG/ML
2 INJECTION, SOLUTION INTRAMUSCULAR; INTRAVENOUS
Status: COMPLETED | OUTPATIENT
Start: 2018-07-30 | End: 2018-07-30

## 2018-07-30 RX ORDER — HYDROCHLOROTHIAZIDE 25 MG/1
25 TABLET ORAL DAILY
Status: DISCONTINUED | OUTPATIENT
Start: 2018-07-31 | End: 2018-08-01 | Stop reason: HOSPADM

## 2018-07-30 RX ORDER — SODIUM CHLORIDE, SODIUM LACTATE, POTASSIUM CHLORIDE, CALCIUM CHLORIDE 600; 310; 30; 20 MG/100ML; MG/100ML; MG/100ML; MG/100ML
150 INJECTION, SOLUTION INTRAVENOUS CONTINUOUS
Status: DISPENSED | OUTPATIENT
Start: 2018-07-30 | End: 2018-07-31

## 2018-07-30 RX ORDER — ZOLPIDEM TARTRATE 5 MG/1
5 TABLET ORAL
Status: DISCONTINUED | OUTPATIENT
Start: 2018-07-30 | End: 2018-08-01 | Stop reason: HOSPADM

## 2018-07-30 RX ORDER — METHYLPREDNISOLONE 4 MG/1
4 TABLET ORAL
Status: DISCONTINUED | OUTPATIENT
Start: 2018-07-30 | End: 2018-07-30

## 2018-07-30 RX ORDER — FENTANYL CITRATE 50 UG/ML
100 INJECTION, SOLUTION INTRAMUSCULAR; INTRAVENOUS ONCE
Status: ACTIVE | OUTPATIENT
Start: 2018-07-30 | End: 2018-07-30

## 2018-07-30 RX ORDER — SODIUM CHLORIDE 0.9 % (FLUSH) 0.9 %
5-10 SYRINGE (ML) INJECTION AS NEEDED
Status: DISCONTINUED | OUTPATIENT
Start: 2018-07-30 | End: 2018-07-30 | Stop reason: HOSPADM

## 2018-07-30 RX ORDER — ROCURONIUM BROMIDE 10 MG/ML
INJECTION, SOLUTION INTRAVENOUS AS NEEDED
Status: DISCONTINUED | OUTPATIENT
Start: 2018-07-30 | End: 2018-07-30 | Stop reason: HOSPADM

## 2018-07-30 RX ORDER — LIDOCAINE HYDROCHLORIDE 20 MG/ML
INJECTION, SOLUTION EPIDURAL; INFILTRATION; INTRACAUDAL; PERINEURAL AS NEEDED
Status: DISCONTINUED | OUTPATIENT
Start: 2018-07-30 | End: 2018-07-30 | Stop reason: HOSPADM

## 2018-07-30 RX ORDER — SODIUM CHLORIDE 900 MG/100ML
INJECTION INTRAVENOUS AS NEEDED
Status: DISCONTINUED | OUTPATIENT
Start: 2018-07-30 | End: 2018-07-30 | Stop reason: HOSPADM

## 2018-07-30 RX ADMIN — HYDROMORPHONE HYDROCHLORIDE 0.5 MG: 2 INJECTION, SOLUTION INTRAMUSCULAR; INTRAVENOUS; SUBCUTANEOUS at 10:16

## 2018-07-30 RX ADMIN — HYDROMORPHONE HYDROCHLORIDE 0.5 MG: 2 INJECTION, SOLUTION INTRAMUSCULAR; INTRAVENOUS; SUBCUTANEOUS at 10:04

## 2018-07-30 RX ADMIN — PROMETHAZINE HYDROCHLORIDE 12.5 MG: 25 INJECTION INTRAMUSCULAR; INTRAVENOUS at 16:36

## 2018-07-30 RX ADMIN — SODIUM CHLORIDE, SODIUM LACTATE, POTASSIUM CHLORIDE, AND CALCIUM CHLORIDE: 600; 310; 30; 20 INJECTION, SOLUTION INTRAVENOUS at 09:15

## 2018-07-30 RX ADMIN — SODIUM CHLORIDE, SODIUM LACTATE, POTASSIUM CHLORIDE, AND CALCIUM CHLORIDE 75 ML/HR: 600; 310; 30; 20 INJECTION, SOLUTION INTRAVENOUS at 12:33

## 2018-07-30 RX ADMIN — VANCOMYCIN HYDROCHLORIDE 1750 MG: 10 INJECTION, POWDER, LYOPHILIZED, FOR SOLUTION INTRAVENOUS at 08:23

## 2018-07-30 RX ADMIN — Medication 2 G: at 07:00

## 2018-07-30 RX ADMIN — MIDAZOLAM HYDROCHLORIDE 2 MG: 1 INJECTION, SOLUTION INTRAMUSCULAR; INTRAVENOUS at 07:02

## 2018-07-30 RX ADMIN — SODIUM CHLORIDE, SODIUM LACTATE, POTASSIUM CHLORIDE, AND CALCIUM CHLORIDE 150 ML/HR: 600; 310; 30; 20 INJECTION, SOLUTION INTRAVENOUS at 06:58

## 2018-07-30 RX ADMIN — NEOSTIGMINE METHYLSULFATE 2 MG: 1 INJECTION INTRAVENOUS at 09:29

## 2018-07-30 RX ADMIN — FAMOTIDINE 20 MG: 20 TABLET ORAL at 06:58

## 2018-07-30 RX ADMIN — BUPROPION HYDROCHLORIDE 150 MG: 150 TABLET, EXTENDED RELEASE ORAL at 18:31

## 2018-07-30 RX ADMIN — EPHEDRINE SULFATE 5 MG: 50 INJECTION, SOLUTION INTRAVENOUS at 08:32

## 2018-07-30 RX ADMIN — Medication 10 ML: at 13:33

## 2018-07-30 RX ADMIN — EPHEDRINE SULFATE 5 MG: 50 INJECTION, SOLUTION INTRAVENOUS at 08:14

## 2018-07-30 RX ADMIN — VANCOMYCIN HYDROCHLORIDE 1250 MG: 10 INJECTION, POWDER, LYOPHILIZED, FOR SOLUTION INTRAVENOUS at 18:39

## 2018-07-30 RX ADMIN — ACETAMINOPHEN 1000 MG: 10 INJECTION, SOLUTION INTRAVENOUS at 10:33

## 2018-07-30 RX ADMIN — PROPOFOL 180 MG: 10 INJECTION, EMULSION INTRAVENOUS at 07:27

## 2018-07-30 RX ADMIN — GLYCOPYRROLATE 0.4 MG: 0.2 INJECTION INTRAMUSCULAR; INTRAVENOUS at 09:29

## 2018-07-30 RX ADMIN — HYDROMORPHONE HYDROCHLORIDE 0.5 MG: 2 INJECTION, SOLUTION INTRAMUSCULAR; INTRAVENOUS; SUBCUTANEOUS at 10:08

## 2018-07-30 RX ADMIN — OXYCODONE HYDROCHLORIDE 15 MG: 15 TABLET ORAL at 12:30

## 2018-07-30 RX ADMIN — EPHEDRINE SULFATE 5 MG: 50 INJECTION, SOLUTION INTRAVENOUS at 07:39

## 2018-07-30 RX ADMIN — CEFAZOLIN SODIUM 2 G: 1 INJECTION, POWDER, FOR SOLUTION INTRAMUSCULAR; INTRAVENOUS at 07:39

## 2018-07-30 RX ADMIN — FENTANYL CITRATE 100 MCG: 50 INJECTION, SOLUTION INTRAMUSCULAR; INTRAVENOUS at 07:27

## 2018-07-30 RX ADMIN — LIDOCAINE HYDROCHLORIDE 50 MG: 20 INJECTION, SOLUTION EPIDURAL; INFILTRATION; INTRACAUDAL; PERINEURAL at 07:27

## 2018-07-30 RX ADMIN — HYDROMORPHONE HYDROCHLORIDE 0.5 MG: 2 INJECTION, SOLUTION INTRAMUSCULAR; INTRAVENOUS; SUBCUTANEOUS at 10:12

## 2018-07-30 RX ADMIN — OXYCODONE HYDROCHLORIDE 15 MG: 15 TABLET ORAL at 16:45

## 2018-07-30 RX ADMIN — ROCURONIUM BROMIDE 40 MG: 10 INJECTION, SOLUTION INTRAVENOUS at 07:27

## 2018-07-30 RX ADMIN — ONDANSETRON 4 MG: 2 INJECTION INTRAMUSCULAR; INTRAVENOUS at 07:30

## 2018-07-30 RX ADMIN — PROMETHAZINE HYDROCHLORIDE 25 MG: 25 INJECTION INTRAMUSCULAR; INTRAVENOUS at 21:11

## 2018-07-30 RX ADMIN — ROCURONIUM BROMIDE 10 MG: 10 INJECTION, SOLUTION INTRAVENOUS at 08:11

## 2018-07-30 RX ADMIN — FENTANYL CITRATE 25 MCG: 50 INJECTION, SOLUTION INTRAMUSCULAR; INTRAVENOUS at 09:00

## 2018-07-30 NOTE — PERIOP NOTES
TRANSFER - OUT REPORT: 
 
Verbal report given to [id number] Cid ERUM Michel on Sathish Horn  being transferred to  20  for routine post - op Report consisted of patients Situation, Background, Assessment and  
Recommendations(SBAR). Information from the following report(s) SBAR, OR Summary, Procedure Summary, Intake/Output and MAR was reviewed with the receiving nurse. Lines:  
Peripheral IV 07/30/18 Right Forearm (Active) Site Assessment Clean, dry, & intact 7/30/2018 10:48 AM  
Phlebitis Assessment 0 7/30/2018 10:48 AM  
Infiltration Assessment 0 7/30/2018 10:48 AM  
Dressing Status Clean, dry, & intact; Occlusive 7/30/2018 10:48 AM  
Dressing Type Transparent;Tape 7/30/2018 10:48 AM  
Hub Color/Line Status Green; Infusing 7/30/2018 10:48 AM  
Alcohol Cap Used No 7/30/2018 10:48 AM  
  
 
Opportunity for questions and clarification was provided. Patient transported with: 
 O2 @ 2 liters VTE prophylaxis orders have been written for Sathish Horn. Patient and family given floor number and nurses name. Family updated re: pt status after security code verified.

## 2018-07-30 NOTE — PROGRESS NOTES
Problem: Falls - Risk of 
Goal: *Absence of Falls Document Wilfredo Clayton Fall Risk and appropriate interventions in the flowsheet. Outcome: Progressing Towards Goal 
Fall Risk Interventions: 
Mobility Interventions: Bed/chair exit alarm, Mechanical lift, PT Consult for mobility concerns, PT Consult for assist device competence Medication Interventions: Bed/chair exit alarm, Assess postural VS orthostatic hypotension, Patient to call before getting OOB, Teach patient to arise slowly, Evaluate medications/consider consulting pharmacy Elimination Interventions: Bed/chair exit alarm, Call light in reach, Elevated toilet seat, Patient to call for help with toileting needs, Toilet paper/wipes in reach, Toileting schedule/hourly rounds

## 2018-07-30 NOTE — PROGRESS NOTES
TRANSFER - IN REPORT: 
 
Verbal report received from Juanjose Glass RN on Jada Flow  being received from PACU for routine post - op Report consisted of patients Situation, Background, Assessment and  
Recommendations(SBAR). Information from the following report(s) SBAR, OR Summary, Procedure Summary, MAR and Med Rec Status was reviewed with the receiving nurse. Opportunity for questions and clarification was provided. Assessment completed upon patients arrival to unit and care assumed.

## 2018-07-30 NOTE — ANESTHESIA PREPROCEDURE EVALUATION
Anesthetic History No history of anesthetic complications Review of Systems / Medical History Patient summary reviewed and pertinent labs reviewed Pulmonary Smoker Neuro/Psych Psychiatric history (depression) Comments: Bilateral rad. Pain, r>l Cardiovascular Hypertension: well controlled Hyperlipidemia Exercise tolerance: >4 METS 
  
GI/Hepatic/Renal 
Within defined limits Endo/Other Within defined limits Other Findings Physical Exam 
 
Airway Mallampati: III 
TM Distance: 4 - 6 cm Neck ROM: normal range of motion Mouth opening: Normal 
 
 Cardiovascular Regular rate and rhythm,  S1 and S2 normal,  no murmur, click, rub, or gallop Rhythm: regular Rate: normal 
 
 
 
 Dental 
 
Dentition: Full upper dentures Pulmonary Breath sounds clear to auscultation Abdominal 
 
 
 
 Other Findings Anesthetic Plan ASA: 2 Anesthesia type: general 
 
 
 
 
Induction: Intravenous Anesthetic plan and risks discussed with: Patient

## 2018-07-30 NOTE — PROGRESS NOTES
07/30/18 1200 Dual Skin Pressure Injury Assessment Dual Skin Pressure Injury Assessment X Second Care Provider (Based on [address] [place] [address]) Brianda Torres RN Skin Integumentary Skin Integumentary (WDL) X Skin Color Appropriate for ethnicity Skin Condition/Temp Warm;Cool Skin Integrity Incision (comment) (back) Turgor Non-tenting Hair Growth Present Varicosities Absent Wound Prevention and Protection Methods Orientation of Wound Prevention Posterior Location of Wound Prevention Sacrum/Coccyx Dressing Present  No  
Wound Offloading (Prevention Methods) Bed, pressure reduction mattress

## 2018-07-30 NOTE — OP NOTES
[facility] REPORT    Audelia Delaney  MR#: [id number]  : 1965  ACCOUNT #: [de-identified]   DATE OF SERVICE: 2018    PREOPERATIVE DIAGNOSES:  L4-5 spondylolisthesis and spinal stenosis. Possible synovial cyst.    POSTOPERATIVE DIAGNOSES:   L4-5 spondylolisthesis and spinal stenosis. Possible synovial cyst.    PROCEDURES PERFORMED:   1. Left L4-5 laminectomy, facetectomy and diskectomy. 2.  L4-5 synovial cyst resection. 3. L4-5 diskectomy. 4. L4-5 transforaminal lumbar interbody fusion with Spine Wave cage, OsteoAMP bone marrow aspirate and autograft bone. 5.  Pedicle screw fusion Orthofix system L4-5.  6.  Lateral mass fusion L4-5 with OsteoAMP bone marrow aspirate and autograft bone. 7.  Bone marrow aspiration, L4 vertebra. 8.  Morselized autograft harvest.  9.  Continuous intraoperative EMG monitoring. 10.  Continuous intraoperative fluoroscopy. SURGEON:  Bernardine Severin, MD     ASSISTANT:  None. ANESTHESIA:  General endotracheal.    ESTIMATED BLOOD LOSS:  Minimal.    PREPARATION:  ChloraPrep. COMPLICATIONS:  None. SPECIMENS REMOVED:  1. L4-5 disk. 2.  L4-5 synovial cyst.    IMPLANTS:  See below. HISTORY OF PRESENT ILLNESS:  A 81-year-old lady with intractable back and leg pain refractory to conservative measures. MRI scan was positive for grade I spondylolisthesis and spinal stenosis at L4-5 and the patient was admitted for surgery as conservative measures have failed. OPERATIVE NOTE:  The patient was brought to the operating room and was carefully placed under general endotracheal anesthesia without complications. Sterling catheter, thigh pneumatic hose, JAIRO hose and intraoperative EMG leads were placed bilaterally and she was carefully turned prone on the Mars frame on top of the OSI bed. The posterior aspect of the back was shaved and prepped in the usual sterile fashion.   An incision was made from L3-S1 in the midline. Muscles were stripped and a left lateral subperiosteal plane with cautery and elevators and deep retractors were placed. The transverse processes were stripped in those levels. Lateral C-arm fluoroscopy confirmed grade I spondylolisthesis in the correct L4-5 level. Next, laminectomy and facetectomy were carried out with 3 and 4 mm Kerrison rongeurs. Significant bony and ligamentous hypertrophy was removed. A plane was created between the ligamentum flavum and dura. Ligament was removed to expose the dural sac and a left-sided synovial cyst was present and was dissected carefully off the dura and resected with a 3 mm Kerrison rongeur and sent for permanent pathologic identification. Distal foraminotomy was widened with a 3 mm Kerrison rongeur to decompress the L5 nerve root. The dural sac and nerve root were mobilized medially to reveal an extruded disk at the disk space, which was incised with a 15 blade and cleaned with pituitary rongeurs. 7, 8, 9 and 10 side-biting curets were used to clean out the disk space as well. At this point, the L4 pedicle was entered with a straight awl, curved monitoring probe, and a ball-tipped probe. No EMG changes were noted. A Jamshidi needle was passed into the vertebral body and approximately 5 mL of bone marrow were aspirated and mixed with OsteoAMP on the back table. The bone that had been removed was denuded of soft tissue, morselized into small pieces and mixed with the OsteoAMP and bone marrow. Next, a 5.5 x 45 mm screw was placed under direct vision using lateral C-arm fluoroscopy and EMG monitoring into the L4 vertebral body without any complications. At this point, the disk space was cleaned again with pituitary rongeurs and packed with allograft bone, OsteoAMP and bone marrow aspirate tightly in the anterior recesses.   An 8.5 Spine Wave titanium expandable cage was passed into the disk space carefully without any complications and expanded to a height of 11 mm. This was confirmed by fluoroscopy to be in good position. The L5 pedicle screw was placed with a straight awl, curved monitoring probe the ball-tipped probe. No EMG changes were noted. A 5.5 x 40 mm screw was placed. AP and lateral x-ray confirmed good trajectory and good placement of the graft and hardware. Next, a small amount of osteolytic was passed into the disk space. A 4 cm prebent stephen was placed and universal heads, locking caps were placed and tightened with a torque wrench screwdriver. No EMG changes were noted on the rods or screws. The lateral masses were packed with OsteoAMP, bone marrow aspirate and autograft and a tight packing type back to the transverse processes was achieved. At this point, the wound was irrigated until clear. Final x-rays were obtained, which confirmed good position of the graft and hardware. A Baljit-Florian drain was placed and brought out through a stab incision inferiorly and secured with 3-0 Vicryl suture. Finally, the wound was closed. The fascia was closed tightly with interrupted 0 Vicryl, subcutaneous tissues were closed with interrupted 3-0 Vicryl. Skin was closed with running locked 3-0 nylon suture and sterile dressings were placed. The patient tolerated the procedure well and was turned supine, awakened, extubated, and taken to PACU in stable condition. There were no obvious complications.       MD ORALIA Li / MN  D: 07/30/2018 09:45     T: 07/30/2018 10:08  JOB #: [id number]

## 2018-07-30 NOTE — ANESTHESIA POSTPROCEDURE EVALUATION
Post-Anesthesia Evaluation and Assessment Patient: Jana Truong MRN: [id number]  SSN: [id number] YOB: 1965  Age: 47 y.o. Sex: female Cardiovascular Function/Vital Signs Visit Vitals  /74  Pulse 81  Temp 37.2 °C (98.9 °F)  Resp 16  
 Ht 5' 2\" (1.575 m)  Wt 73 kg (161 lb)  SpO2 98%  BMI 29.45 kg/m2 Patient is status post general anesthesia for Procedure(s): 
L4-L5 SPINE TRANSFORAMINAL LUMBAR INTERBODY FUSION (TLIF)  . Nausea/Vomiting: None Postoperative hydration reviewed and adequate. Pain: 
Pain Scale 1: Numeric (0 - 10) (07/30/18 1048) Pain Intensity 1: 3 (07/30/18 1048) Managed Neurological Status:  
Neuro (WDL): Within Defined Limits (07/30/18 1048) Neuro LUE Motor Response: Purposeful (07/30/18 1048) LLE Motor Response: Purposeful (07/30/18 1048) RUE Motor Response: Purposeful (07/30/18 1048) RLE Motor Response: Purposeful (07/30/18 1048) At baseline Mental Status and Level of Consciousness: Arousable Pulmonary Status:  
O2 Device: Nasal cannula (07/30/18 1048) Adequate oxygenation and airway patent Complications related to anesthesia: None Post-anesthesia assessment completed. No concerns Signed By: Enrique Bang MD   
 July 30, 2018

## 2018-07-30 NOTE — BRIEF OP NOTE
BRIEF OPERATIVE NOTE Date of Procedure: 7/30/2018 Preoperative Diagnosis: Spondylolisthesis of lumbar region [M43.16] Lumbar stenosis with neurogenic claudication [M48.062] Postoperative Diagnosis: Spondylolisthesis of lumbar region [M43.16] Lumbar stenosis with neurogenic claudication [M48.062] Procedure(s): 
L4-L5 SPINE TRANSFORAMINAL LUMBAR INTERBODY FUSION (TLIF) Surgeon(s) and Role: Gareth Bruce MD - Primary Surgical Assistant: NONE Surgical Staff: 
Circ-1: Ramon Apple RN 
Circ-Relief: Denisha Lawrence RN Radiology Technician: Ekta Mora, RT, R, CT Scrub Tech-1: Ap Plane Scrub Private/Assistant: Juancarlos Galloway Nurse Practitioner: Dori Nash NP Event Time In Incision Start 4563 Incision Close Anesthesia: General  
Estimated Blood Loss: MINIMAL Specimens:  
ID Type Source Tests Collected by Time Destination 1 : L4-5 synovial cyst Preservative Cyst  Jack Fleming MD 7/30/2018 4392 Pathology 2 : L4-5 disc material Preservative Disc Material  Jack Fleming MD 7/30/2018 3992 Pathology Findings: STENOSIS Complications: NONE Implants:  
Implant Name Type Inv. Item Serial No.  Lot No. LRB No. Used Action GRAFT BNE GRAN 5ML -- OSTEOAMP - [id number]  GRAFT BNE GRAN 5ML -- OSTEOAMP [id number] [employer]  N/A 1 Implanted SCR SPNE FIX MIDLNE [id number] -- JANUS - [id number]  SCR SPNE FIX MIDLNE [id number] -- JANUS  ORTHOFIX SPINAL IMPLANTS [id number] N/A 1 Implanted SCR SPNE FIX MIDLNE [id number] -- JANUS - [id number]  SCR SPNE FIX MIDLNE [id number] -- JANUS  ORTHOFIX SPINAL IMPLANTS [id number] N/A 1 Implanted DAVID PRELORDOSED 5.5X45 Tauna Upton - [id number]  DAVID PRELORDOSED 5.5X45 TI -- FIREBIRD  ORTHOFIX SPINAL IMPLANTS [id number] N/A 1 Implanted SET SCREW -- Elverna Oar - [id number]  SET SCREW -- YOUNG ALMEIDA  ORTHOFIX SPINAL IMPLANTS [id number] N/A 2 Implanted BODY TOP LD IMPL -- YOUNG ALMEIDA - [id number]  BODY TOP LD IMPL -- FIREBIRD [id number]  ORTHOFIX SPINAL IMPLANTS [id number] N/A 2 Implanted 1 Implanted

## 2018-07-30 NOTE — PROGRESS NOTES
present at bedside in pre-op area during assessments with unit nurse, Dr. Oanh Elliott, Dr. Holley Ramirez, signature of consents and information verification with OR staff Sonja Singleton Patient Relations [employer] Services 
c: [phone] / [email or web address] Lakisha Fajardo / [place], [address] [facility] 
[email or web address]

## 2018-07-30 NOTE — IP AVS SNAPSHOT
[facility] 
 
 
 [address] 
[phone] Patient: Fransisco Adams MRN: [id number] :[id number] About your hospitalization You were admitted on:  2018 You last received care in the:  [facility] 7 MED SURG You were discharged on:  2018 Why you were hospitalized Your primary diagnosis was:  Spondylolisthesis, Lumbar Region Follow-up Information Follow up With Details Comments Contact Info Artemio Vanegas,  Call As needed [address]  
[address] [facility] [address] 
[phone] Dionisio Regalado MD On 2018 10:30 AM for drain pull [place] [address] [facility] [address] 
[phone] Your Scheduled Appointments 2018 10:30 AM EDT  
WOUND CHECK with [facility] NURSE  
[facility] ([facility]) [address] [place] [address]  
[phone] Discharge Orders None A check austin indicates which time of day the medication should be taken. My Medications START taking these medications Instructions Each Dose to Equal  
 Morning Noon Evening Bedtime  
 cefUROXime 500 mg tablet Commonly known as:  CEFTIN Your next dose is: This evening Take 1 Tab by mouth two (2) times a day. 500 mg CHANGE how you take these medications Instructions Each Dose to Equal  
 Morning Noon Evening Bedtime * oxyCODONE-acetaminophen  mg per tablet Commonly known as:  PERCOCET 10 What changed:  Another medication with the same name was added. Make sure you understand how and when to take each. Take 1 Tab by mouth every six (6) hours as needed for Pain. Max Daily Amount: 4 Tabs. 1 Tab * oxyCODONE-acetaminophen 7.5-325 mg per tablet Commonly known as:  PERCOCET 7.5 What changed: You were already taking a medication with the same name, and this prescription was added. Make sure you understand how and when to take each. Your next dose is: Take on as needed schedule Take 1 Tab by mouth every eight (8) hours as needed for Pain. Max Daily Amount: 3 Tabs. 1 Tab * Notice: This list has 2 medication(s) that are the same as other medications prescribed for you. Read the directions carefully, and ask your doctor or other care provider to review them with you. CONTINUE taking these medications Instructions Each Dose to Equal  
 Morning Noon Evening Bedtime  
 atorvastatin 20 mg tablet Commonly known as:  LIPITOR Your next dose is:  Tomorrow Morning Take 1 Tab by mouth daily. 20 mg  
    
  
   
   
   
  
 BENADRYL 25 mg capsule Generic drug:  diphenhydrAMINE Your next dose is: Take tonight IF needed Take 25 mg by mouth nightly as needed. 25 mg  
    
   
   
   
  
 buPROPion  mg tablet Commonly known as:  Patiñojay Mcrae Your next dose is:  Tomorrow Morning Take 1 Tab by mouth daily (after breakfast). 150 mg FLUoxetine 40 mg capsule Commonly known as:  PROzac Your next dose is:  Tomorrow Morning TAKE ONE CAPSULE BY MOUTH ONE TIME DAILY  
     
  
   
   
   
  
 hydroCHLOROthiazide 25 mg tablet Commonly known as:  HYDRODIURIL Your next dose is:  Tomorrow Morning Take 1 Tab by mouth daily. 25 mg  
    
  
   
   
   
  
 lisinopril 5 mg tablet Commonly known as:  Tatiana Pinch Your next dose is:  Tomorrow Morning Take 1 Tab by mouth daily. 5 mg  
    
  
   
   
   
  
 methylPREDNISolone 4 mg tablet Commonly known as:  MEDROL (SLADE) Your next dose is:  Tomorrow Morning Take 1 Tab by mouth Specific Days and Specific Times. Per dose pack instructions 4 mg [address] Your next dose is:  Resume home schedule Take  by mouth.  
     
   
   
   
  
 mupirocin calcium 2 % nasal ointment Commonly known as:  BACTROBAN  
   
 by Both Nostrils route two (2) times a day. naproxen 500 mg tablet Commonly known as:  NAPROSYN Your next dose is: This evening Take 500 mg by mouth two (2) times daily (with meals). 500 mg Where to Get Your Medications These medications were sent to [facility], [address], [address] [address] Phone:  [phone]  
  cefUROXime 500 mg tablet Information on where to get these meds will be given to you by the nurse or doctor. ! Ask your nurse or doctor about these medications  
  oxyCODONE-acetaminophen 7.5-325 mg per tablet Opioid Education Prescription Opioids: What You Need to Know: 
 
Prescription opioids can be used to help relieve moderate-to-severe pain and are often prescribed following a surgery or injury, or for certain health conditions. These medications can be an important part of treatment but also come with serious risks. Opioids are strong pain medicines. Examples include hydrocodone, oxycodone, fentanyl, and morphine. Heroin is an example of an illegal opioid. It is important to work with your health care provider to make sure you are getting the safest, most effective care. WHAT ARE THE RISKS AND SIDE EFFECTS OF OPIOID USE? Prescription opioids carry serious risks of addiction and overdose, especially with prolonged use. An opioid overdose, often marked by slow breathing, can cause sudden death. The use of prescription opioids can have a number of side effects as well, even when taken as directed. · Tolerance-meaning you might need to take more of a medication for the same pain relief · Physical dependence-meaning you have symptoms of withdrawal when the medication is stopped. Withdrawal symptoms can include nausea, sweating, chills, diarrhea, stomach cramps, and muscle aches. Withdrawal can last up to several weeks, depending on which drug you took and how long you took it. · Increased sensitivity to pain · Constipation · Nausea, vomiting, and dry mouth · Sleepiness and dizziness · Confusion · Depression · Low levels of testosterone that can result in lower sex drive, energy, and strength · Itching and sweating RISKS ARE GREATER WITH:      
· History of drug misuse, substance use disorder, or overdose · Mental health conditions (such as depression or anxiety) · Sleep apnea · Older age (72 years or older) · Pregnancy Avoid alcohol while taking prescription opioids. Also, unless specifically advised by your health care provider, medications to avoid include: · Benzodiazepines (such as Xanax or Valium) · Muscle relaxants (such as Soma or Flexeril) · Hypnotics (such as Ambien or Lunesta) · Other prescription opioids KNOW YOUR OPTIONS Talk to your health care provider about ways to manage your pain that don't involve prescription opioids. Some of these options may actually work better and have fewer risks and side effects. Options may include: 
· Pain relievers such as acetaminophen, ibuprofen, and naproxen · Some medications that are also used for depression or seizures · Physical therapy and exercise · Counseling to help patients learn how to cope better with triggers of pain and stress. · Application of heat or cold compress · Massage therapy · Relaxation techniques Be Informed Make sure you know the name of your medication, how much and how often to take it, and its potential risks & side effects.  
 
IF YOU ARE PRESCRIBED OPIOIDS FOR PAIN: 
 · Never take opioids in greater amounts or more often than prescribed. Remember the goal is not to be pain-free but to manage your pain at a tolerable level. · Follow up with your primary care provider to: · Work together to create a plan on how to manage your pain. · Talk about ways to help manage your pain that don't involve prescription opioids. · Talk about any and all concerns and side effects. · Help prevent misuse and abuse. · Never sell or share prescription opioids · Help prevent misuse and abuse. · Store prescription opioids in a secure place and out of reach of others (this may include visitors, children, friends, and family). · Safely dispose of unused/unwanted prescription opioids: Find your community drug take-back program or your pharmacy mail-back program, or flush them down the toilet, following guidance from the Food and Drug Administration (www.fda.gov/Drugs/ResourcesForYou). · Visit www.cdc.gov/drugoverdose to learn about the risks of opioid abuse and overdose. · If you believe you may be struggling with addiction, tell your health care provider and ask for guidance or call [address] [place] [address] "[employer] at 4-883-851-LSUK. Discharge Instructions MAY shower (may shower with dressing on)-->NO tub baths LEAVE dressing on incision for 3 DAYS-->then may remove (If dressing starts to fall off may re-secure with tape) NO lifting anything heavier than 5LBS  
 
WEAR your brace if prescribed at all times EXCEPT when in bed, just going to the bathroom, or showering NO Bending, Lifting or Twisting Avoid sitting more than 20 - 30 minutes at a time NO driving until directed by your doctor DO NOT take any NSAIDS (either prescribed or over the counter until directed (Aleve, Ibuprofen, Mobic, etc) as this will interfere with bone healing CALL Dr. Tran Confer if:  Fever >100.5 ([phone])               Incision becomes red/ swollen/ opens up Incision has yellow, thick drainage or an odor Pain is not managed with prescribed medications Excessive nausea and/or vomiting Avoid having pets sleep in bed with you until incision is completely healed Surgical Drain Care: Care Instructions Your Care Instructions After a surgery, fluid may collect inside your body in the surgical area. This makes an infection or other problems more likely. A surgical drain allows the fluid to flow out. The doctor puts a thin, flexible rubber tube into the area of your body where the fluid is likely to collect. The rubber tube carries the fluid outside your body. The most common type of surgical drain carries the fluid into a collection bulb that you empty. This is called a Baljit-Florian (JACQUES) drain. The drain uses suction created by the bulb to pull the fluid from your body into the bulb. The rubber tube will probably be held in place by one or two stitches in your skin. The bulb will probably be attached with a safety pin to your clothes or near the bandage so that it doesn't flip around or pull on the stitches. Another type of drain is called a Penrose drain. This type of drain doesn't have a bulb. Instead, the end of the tube is open. That allows the fluid to drain onto a dressing taped to your skin. The drain may be kept in place next to your skin with a stitch or a safety pin in the tube. When you first get the drain, the fluid will be bloody. It will change color from red to pink to a light yellow or clear as the wound heals and the fluid starts to go away. Your doctor may give you information on when you no longer need the drain and when it will be removed. Follow-up care is a key part of your treatment and safety.  Be sure to make and go to all appointments, and call your doctor if you are having problems. It's also a good idea to know your test results and keep a list of the medicines you take. How can you care for yourself at home? How to empty the bulb of a Baljit-Florian drain Follow any instructions your doctor gives you. How often you empty the bulb depends on how much fluid is draining. Empty the bulb when it is half full. 1. Wash your hands with soap and water. 2. Take the plug out of the bulb. 3. Empty the bulb. If your doctor asks you to measure the fluid, empty the fluid into a measuring cup, and write down the color and how much you collected. Your doctor will want to know this information. 4. Clean the plug with alcohol. 5. Squeeze the bulb until it is flat. This removes all the air from the bulb. You may need to put the bulb on a table or a counter to flatten it. 6. Keep the bulb flat, and put the plug in. The bulb should stay flat after you put the plug back in. This creates the suction that pulls the fluid into the bulb. 7. Empty the fluid into the toilet. 8. Wash your hands. How to change the dressing around your surgical drain You may have a dressing (bandage). The dressing is often made of gauze pads held on with tape. Your doctor will tell you how often to change it. 1. Wash your hands with soap and water. 2. Take off the dressing from around the drain. 3. Clean the drain site and the skin around it with soap and water. Use gauze or a cotton swab. 4. When the site is dry, put on a new dressing. The way your dressing is put on depends on what kind of drain you have. You will get instructions for your type of drain. 5. Wash your hands again with soap and water. Your doctor may ask you to keep track of your dressing changes. Write down the time of day and the amount and color of the fluid on the dressing. How to help prevent clogs in your surgical drain Squeezing or \"milking\" the tube of your surgical drain can help prevent clogs so that it drains correctly. Your doctor will tell you when you need to do this. In general, you do this when: 
· You see a clot in the tube that prevents fluid from draining. The clot may look like a dark, stringy lining. · You see fluid leaking around the tube where it goes into the skin. Follow these steps for milking the tube. 1. Use one hand to hold and pinch the tube where it leaves the skin. 2. With the thumb and first finger of your other hand, pinch the tube just below where you're holding it. 3. Slowly and firmly push your thumb and first finger down the tubing toward the end of the tube. 4. Repeat this as many times as needed to move the clot. If you have a Baljit-Florian (JACQUES) drain, the clot should move down the tube and into the bulb. If you have a Penrose drain, the clot should move into the dressing. When should you call for help? Call your doctor now or seek immediate medical care if: 
  · You have signs of infection, such as: 
¨ Increased pain, swelling, warmth, or redness around the area. ¨ Red streaks leading from the area. ¨ Pus draining from the area. ¨ A fever.  
  · You see a sudden change in the color or smell of the drainage.  
  · The tube is coming loose where it leaves your skin.  
 Watch closely for changes in your health, and be sure to contact your doctor if: 
  · You see a lot of fluid around the drain.  
  · You cannot remove a clot from the tube by milking the tube. Where can you learn more? Go to http://mitul-fauzia.info/. Enter K117 in the search box to learn more about \"Surgical Drain Care: Care Instructions. \" Current as of: January 10, 2018 Content Version: 11.7 © 2633-9660 [employer]. Care instructions adapted under license by [employer] (which disclaims liability or warranty for this information).  If you have questions about a medical condition or this instruction, always ask your healthcare professional. [place] [address] any warranty or liability for your use of this information. DISCHARGE SUMMARY from Nurse PATIENT INSTRUCTIONS: 
 
 
F-face looks uneven A-arms unable to move or move unevenly S-speech slurred or non-existent T-time-call 911 as soon as signs and symptoms begin-DO NOT go Back to bed or wait to see if you get better-TIME IS BRAIN. Warning Signs of HEART ATTACK Call 911 if you have these symptoms: 
? Chest discomfort. Most heart attacks involve discomfort in the center of the chest that lasts more than a few minutes, or that goes away and comes back. It can feel like uncomfortable pressure, squeezing, fullness, or pain. ? Discomfort in other areas of the upper body. Symptoms can include pain or discomfort in one or both arms, the back, neck, jaw, or stomach. ? Shortness of breath with or without chest discomfort. ? Other signs may include breaking out in a cold sweat, nausea, or lightheadedness. Don't wait more than five minutes to call [address]! Fast action can save your life. Calling 911 is almost always the fastest way to get lifesaving treatment. Emergency Medical Services staff can begin treatment when they arrive  up to an hour sooner than if someone gets to the hospital by car. The discharge information has been reviewed with the patient. The patient verbalized understanding. Discharge medications reviewed with the patient and appropriate educational materials and side effects teaching were provided. ___________________________________________________________________________________________________________________________________ Introducing [facility]! [facility] introduce portal paciente MyChart . Ahora se puede acceder a partes de barragan expediente médico, enviar por correo electrónico la oficina de barragan médico y solicitar renovaciones de medicamentos en línea. En barragan navegador de Internet , An Arevalo a [email or web address] Veronica clic en el usuario por Aldo Griffith? Clemargi Corona clic aquí en la sesión [facility]. Verá la página de registro [place]. Ingrese barragan código de [facility] caitlyn y maryellen aparece a continuación. Usted no tendrá que [employer] código después de negar completado el proceso de registro . Si usted no se inscribe antes de la fecha de caducidad , debe solicitar un nuevo código. · MyChart Código de acceso : [id number] Expires: 10/25/2018 10:59 AM 
 
Ingresa los últimos cuatro dígitos de barragan Número de Seguro Social ( xxxx ) y fecha de nacimiento ( dd / mm / aaaa ) maryellen se indica y veronica clic en Enviar. Usted será llevado a la siguiente página de registro . Crear un ID MyChart . Esta será barragan ID de inicio de sesión de MyChart y no puede ser [place] , por lo que pensar en kori que es Yasmine Sis y fácil de recordar . Crear kori contraseña MyChart . Usted puede cambiar barragan contraseña en cualquier momento . Ingrese barragan Password Reset de preguntas y [place] . [place] se puede utilizar en un momento posterior si usted olvida barragan contraseña. Introduzca barragan dirección de correo electrónico . Hoy Reining recibirá kori notificación por correo electrónico cuando la nueva información está disponible en MyChart . Pama Hugger clic en Registrarse. Clearance Sitter bhavin y descargar porciones de barragan expediente médico. 
Veronica clic en el enlace de descarga del menú Resumen para descargar kori copia portátil de barragan información médica . Si tiene [employer] , por favor visite la sección de preguntas frecuentes del sitio web MyChart . Recuerde, MyChart NO es que se utilizará para las necesidades urgentes. Para emergencias médicas , llame al 911 . Gretchenora disponible en barragan iPhone y Android ! Introducing Herb Cintron As a [employer] patient, I wanted to make you aware of our electronic visit tool called Herb Cintron. [employer] 24/7 allows you to connect within minutes with a medical provider 24 hours a day, seven days a week via a mobile device or tablet or logging into a secure website from your computer. You can access Herb Cintron from anywhere in the [place]. A virtual visit might be right for you when you have a simple condition and feel like you just dont want to get out of bed, or cant get away from work for an appointment, when your regular [employer] provider is not available (evenings, weekends or holidays), or when youre out of town and need minor care. Electronic visits cost only $49 and if the [employer] 24/7 provider determines a prescription is needed to treat your condition, one can be electronically transmitted to a nearby pharmacy*. Please take a moment to enroll today if you have not already done so. The enrollment process is free and takes just a few minutes. To enroll, please download the [employer] 24/7 chloe to your tablet or phone, or visit [email or web address] to enroll on your computer. And, as an [address] [place] [address] patient with a [employer] account, the results of your visits will be scanned into your electronic medical record and your primary care provider will be able to view the scanned results. We urge you to continue to see your regular [employer] provider for your ongoing medical care. And while your primary care provider may not be the one available when you seek a Herb Cintron virtual visit, the peace of mind you get from getting a real diagnosis real time can be priceless. For more information on Herb Cintron, view our Frequently Asked Questions (FAQs) at [email or web address]. Sincerely, 
 
Lissa Stevens MD 
Chief Medical Officer [facility] Yamileth Samano *:  certain medications cannot be prescribed via Herb Cintron Unresulted Labs-Please follow up with your PCP about these lab tests Order Current Status [place] XR TECHNOLOGIST SERVICE In process Providers Seen During Your Hospitalization Provider Specialty Primary office phone Jenna Leija MD Neurosurgery [phone] Your Primary Care Physician (PCP) Primary Care Physician Office Phone Office Fax [place] [phone] You are allergic to the following Allergen Reactions Peanut Hives Recent Documentation Height Weight BMI OB Status Smoking Status 1.575 m 73 kg 29.45 kg/m2 Perimenopausal Current Every Day Smoker Emergency Contacts Name Discharge Info Relation Home Work Mobile Kade Steinberg [22] [phone] Patient Belongings The following personal items are in your possession at time of discharge: 
  Dental Appliances: Uppers  Visual Aid: None      Home Medications: None      Clothing: Footwear, Pants, Shirt, Undergarments    Other Valuables: None  Personal Items Sent to Safe: none Please provide this summary of care documentation to your next provider. Signatures-by signing, you are acknowledging that this After Visit Summary has been reviewed with you and you have received a copy. Patient Signature:  ____________________________________________________________ Date:  ____________________________________________________________  
  
Josph Perfect Provider Signature:  ____________________________________________________________ Date:  ____________________________________________________________  
  
  
   
  
John Raymundo [address] [facility] 
[phone] Patient: Geetha Toussaint MRN: [id number] :1965 Sobre barragan hospitalización Le admitieron el:  July 30, 2018 Berrios tratamiento más reciente fue el:  [facility] 7 MED SURG Le dieron de yazmin el:  August 1, 2018 Por qué le ingresaron Berrios diagnosis primaria es:  Spondylolisthesis, Lumbar Region Follow-up Information Follow up With Details Comments Contact Info Edson Leavitt DO Call As needed [address] 
[address] [facility] [address] 
[phone] Bianca Contreras MD On 8/6/2018 10:30 AM for drain pull [address] [facility] [address] [facility] [place] [facility] [address] 
[phone] Your Scheduled Appointments Monday August 06, 2018 10:30 AM EDT  
WOUND CHECK with [facility] NURSE  
[facility] ([facility]) [address] [address] [place] [address]  
[phone] Discharge Orders Quincee A check austin indicates which time of day the medication should be taken. My Medications EMPIECE a navigaya Instructions Each Dose to Equal  
 Morning Noon Evening Bedtime  
 cefUROXime 500 mg tablet También conocido maryellen:  Elijah Bright Your next dose is: This evening Take 1 Tab by mouth two (2) times a day. 500 mg  
    
  
   
   
  
   
  
  
CAMBIE la forma de navigaya Instructions Each Dose to Equal  
 Morning Noon Evening Bedtime * oxyCODONE-acetaminophen  mg per tablet También conocido maryellen:  PERCOCET 10 Lo que cambió:  Se agregó otro medicamento con el mismo nombre. Asegúrese de que entiende cómo y cuándo ward cada jena. Take 1 Tab by mouth every six (6) hours as needed for Pain. Max Daily Amount: 4 Tabs. 1 Tab * oxyCODONE-acetaminophen 7.5-325 mg per tablet También conocido maryellen:  PERCOCET 7.5 Lo que cambió:  Usted ya tomaba otro medicamento con el mismo nombre y se agregó esta receta. Asegúrese de que entiende cómo y cuándo ward cada jena. Your next dose is: Take on as needed schedule Take 1 Tab by mouth every eight (8) hours as needed for Pain. Max Daily Amount: 3 Tabs. 1 Tab * Aviso:  Esta lista contiene medicamentos que son iguales a otros medicamentos recetados para usted. Funmi las instrucciones con cuidado y pida a barragan personal médico que revise la lista de medicamentos y las instrucciones correspondientes con usted. SIGA tomando estos medicamentos Instructions Each Dose to Equal  
 Morning Noon Evening Bedtime  
 atorvastatin 20 mg tablet También conocido maryellen:  LIPITOR Your next dose is:  Tomorrow Morning Take 1 Tab by mouth daily. 20 mg  
    
  
   
   
   
  
 BENADRYL 25 mg capsule Medicamento genérico:  diphenhydrAMINE Your next dose is: Take tonight IF needed Take 25 mg by mouth nightly as needed. 25 mg  
    
   
   
   
  
 buPROPion  mg tablet También conocido maryellen:  WELLBUTRIN XL Your next dose is:  Tomorrow Morning Take 1 Tab by mouth daily (after breakfast). 150 mg FLUoxetine 40 mg capsule También conocido maryellen:  PROzac Your next dose is:  Tomorrow Morning TAKE ONE CAPSULE BY MOUTH ONE TIME DAILY  
     
  
   
   
   
  
 hydroCHLOROthiazide 25 mg tablet También conocido maryellen:  HYDRODIURIL Your next dose is:  Tomorrow Morning Take 1 Tab by mouth daily. 25 mg  
    
  
   
   
   
  
 lisinopril 5 mg tablet También conocido maryellen:  Richard Economy Your next dose is:  Tomorrow Morning Take 1 Tab by mouth daily. 5 mg  
    
  
   
   
   
  
 methylPREDNISolone 4 mg tablet También conocido maryellen:  MEDROL (SLADE) Your next dose is:  Tomorrow Morning Take 1 Tab by mouth Specific Days and Specific Times. Per dose pack instructions 4 mg [address] Your next dose is:  Resume home schedule Take  by mouth.  
     
   
   
   
  
 mupirocin calcium 2 % nasal ointment También conocido maryellen:  BACTROBAN  
   
 by Both Nostrils route two (2) times a day. naproxen 500 mg tablet También conocido maryellen:  NAPROSYN Your next dose is: This evening Take 500 mg by mouth two (2) times daily (with meals). 500 mg  
    
  
   
   
  
   
  
  
  
Dónde puede recoger shivani medicamentos Estos medicamentos fueron enviados a [facility] nad Pam Kirk [address], [facility] [address] Teléfono:  [phone]  
  cefUROXime 500 mg tablet Information on where to get these meds will be given to you by the nurse or doctor. ! Pregunte a barragna médico o enfermero/a sobre estos medicamentos  
  oxyCODONE-acetaminophen 7.5-325 mg per tablet Opioid Education Prescription Opioids: What You Need to Know: 
 
Prescription opioids can be used to help relieve moderate-to-severe pain and are often prescribed following a surgery or injury, or for certain health conditions. These medications can be an important part of treatment but also come with serious risks. Opioids are strong pain medicines. Examples include hydrocodone, oxycodone, fentanyl, and morphine. Heroin is an example of an illegal opioid. It is important to work with your health care provider to make sure you are getting the safest, most effective care. WHAT ARE THE RISKS AND SIDE EFFECTS OF OPIOID USE? Prescription opioids carry serious risks of addiction and overdose, especially with prolonged use. An opioid overdose, often marked by slow breathing, can cause sudden death. The use of prescription opioids can have a number of side effects as well, even when taken as directed.  
  
· Tolerance-meaning you might need to take more of a medication for the same pain relief · Physical dependence-meaning you have symptoms of withdrawal when the medication is stopped. Withdrawal symptoms can include nausea, sweating, chills, diarrhea, stomach cramps, and muscle aches. Withdrawal can last up to several weeks, depending on which drug you took and how long you took it. · Increased sensitivity to pain · Constipation · Nausea, vomiting, and dry mouth · Sleepiness and dizziness · Confusion · Depression · Low levels of testosterone that can result in lower sex drive, energy, and strength · Itching and sweating RISKS ARE GREATER WITH:      
· History of drug misuse, substance use disorder, or overdose · Mental health conditions (such as depression or anxiety) · Sleep apnea · Older age (72 years or older) · Pregnancy Avoid alcohol while taking prescription opioids. Also, unless specifically advised by your health care provider, medications to avoid include: · Benzodiazepines (such as Xanax or Valium) · Muscle relaxants (such as Soma or Flexeril) · Hypnotics (such as Ambien or Lunesta) · Other prescription opioids KNOW YOUR OPTIONS Talk to your health care provider about ways to manage your pain that don't involve prescription opioids. Some of these options may actually work better and have fewer risks and side effects. Options may include: 
· Pain relievers such as acetaminophen, ibuprofen, and naproxen · Some medications that are also used for depression or seizures · Physical therapy and exercise · Counseling to help patients learn how to cope better with triggers of pain and stress. · Application of heat or cold compress · Massage therapy · Relaxation techniques Be Informed Make sure you know the name of your medication, how much and how often to take it, and its potential risks & side effects. IF YOU ARE PRESCRIBED OPIOIDS FOR PAIN: 
· Never take opioids in greater amounts or more often than prescribed. Remember the goal is not to be pain-free but to manage your pain at a tolerable level. · Follow up with your primary care provider to: · Work together to create a plan on how to manage your pain. · Talk about ways to help manage your pain that don't involve prescription opioids. · Talk about any and all concerns and side effects. · Help prevent misuse and abuse. · Never sell or share prescription opioids · Help prevent misuse and abuse. · Store prescription opioids in a secure place and out of reach of others (this may include visitors, children, friends, and family). · Safely dispose of unused/unwanted prescription opioids: Find your community drug take-back program or your pharmacy mail-back program, or flush them down the toilet, following guidance from the Food and Drug Administration (www.fda.gov/Drugs/ResourcesForYou). · Visit www.cdc.gov/drugoverdose to learn about the risks of opioid abuse and overdose. · If you believe you may be struggling with addiction, tell your health care provider and ask for guidance or call [facility] Continuent at 1-155-538-[facility]. Instrucciones a tono de yazmin MAY shower (may shower with dressing on)-->NO tub baths LEAVE dressing on incision for 3 DAYS-->then may remove (If dressing starts to fall off may re-secure with tape) NO lifting anything heavier than 5LBS  
 
WEAR your brace if prescribed at all times EXCEPT when in bed, just going to the bathroom, or showering NO Bending, Lifting or Twisting Avoid sitting more than 20 - 30 minutes at a time NO driving until directed by your doctor DO NOT take any NSAIDS (either prescribed or over the counter until directed (Aleve, Ibuprofen, Mobic, etc) as this will interfere with bone healing CALL Dr. Sandra Whitfield if:  Fever >100.5 
([phone])               Incision becomes red/ swollen/ opens up Incision has yellow, thick drainage or an odor Pain is not managed with prescribed medications Excessive nausea and/or vomiting Avoid having pets sleep in bed with you until incision is completely healed Surgical Drain Care: Care Instructions Your Care Instructions After a surgery, fluid may collect inside your body in the surgical area. This makes an infection or other problems more likely. A surgical drain allows the fluid to flow out. The doctor puts a thin, flexible rubber tube into the area of your body where the fluid is likely to collect. The rubber tube carries the fluid outside your body. The most common type of surgical drain carries the fluid into a collection bulb that you empty. This is called a Balijt-Florian (JACQUES) drain. The drain uses suction created by the bulb to pull the fluid from your body into the bulb. The rubber tube will probably be held in place by one or two stitches in your skin. The bulb will probably be attached with a safety pin to your clothes or near the bandage so that it doesn't flip around or pull on the stitches. Another type of drain is called a Penrose drain. This type of drain doesn't have a bulb. Instead, the end of the tube is open. That allows the fluid to drain onto a dressing taped to your skin. The drain may be kept in place next to your skin with a stitch or a safety pin in the tube. When you first get the drain, the fluid will be bloody. It will change color from red to pink to a light yellow or clear as the wound heals and the fluid starts to go away. Your doctor may give you information on when you no longer need the drain and when it will be removed. Follow-up care is a key part of your treatment and safety. Be sure to make and go to all appointments, and call your doctor if you are having problems. It's also a good idea to know your test results and keep a list of the medicines you take. How can you care for yourself at home? How to empty the bulb of a Baljit-Florian drain Follow any instructions your doctor gives you. How often you empty the bulb depends on how much fluid is draining. Empty the bulb when it is half full. 1. Wash your hands with soap and water. 2. Take the plug out of the bulb. 3. Empty the bulb. If your doctor asks you to measure the fluid, empty the fluid into a measuring cup, and write down the color and how much you collected. Your doctor will want to know this information. 4. Clean the plug with alcohol. 5. Squeeze the bulb until it is flat. This removes all the air from the bulb. You may need to put the bulb on a table or a counter to flatten it. 6. Keep the bulb flat, and put the plug in. The bulb should stay flat after you put the plug back in. This creates the suction that pulls the fluid into the bulb. 7. Empty the fluid into the toilet. 8. Wash your hands. How to change the dressing around your surgical drain You may have a dressing (bandage). The dressing is often made of gauze pads held on with tape. Your doctor will tell you how often to change it. 1. Wash your hands with soap and water. 2. Take off the dressing from around the drain. 3. Clean the drain site and the skin around it with soap and water. Use gauze or a cotton swab. 4. When the site is dry, put on a new dressing. The way your dressing is put on depends on what kind of drain you have. You will get instructions for your type of drain. 5. Wash your hands again with soap and water. Your doctor may ask you to keep track of your dressing changes. Write down the time of day and the amount and color of the fluid on the dressing. How to help prevent clogs in your surgical drain Squeezing or \"milking\" the tube of your surgical drain can help prevent clogs so that it drains correctly. Your doctor will tell you when you need to do this. In general, you do this when: · You see a clot in the tube that prevents fluid from draining. The clot may look like a dark, stringy lining. · You see fluid leaking around the tube where it goes into the skin. Follow these steps for milking the tube. 1. Use one hand to hold and pinch the tube where it leaves the skin. 2. With the thumb and first finger of your other hand, pinch the tube just below where you're holding it. 3. Slowly and firmly push your thumb and first finger down the tubing toward the end of the tube. 4. Repeat this as many times as needed to move the clot. If you have a Baljit-Florian (JACQUES) drain, the clot should move down the tube and into the bulb. If you have a Penrose drain, the clot should move into the dressing. When should you call for help? Call your doctor now or seek immediate medical care if: 
  · You have signs of infection, such as: 
¨ Increased pain, swelling, warmth, or redness around the area. ¨ Red streaks leading from the area. ¨ Pus draining from the area. ¨ A fever.  
  · You see a sudden change in the color or smell of the drainage.  
  · The tube is coming loose where it leaves your skin.  
 Watch closely for changes in your health, and be sure to contact your doctor if: 
  · You see a lot of fluid around the drain.  
  · You cannot remove a clot from the tube by milking the tube. Where can you learn more? Go to http://mitul-fauzia.info/. Enter K117 in the search box to learn more about \"Surgical Drain Care: Care Instructions. \" Current as of: January 10, 2018 Content Version: 11.7 © 8208-1912 [employer]. Care instructions adapted under license by [employer] (which disclaims liability or warranty for this information). If you have questions about a medical condition or this instruction, always ask your healthcare professional. Madiha Abbasi any warranty or liability for your use of this information. DISCHARGE SUMMARY from Nurse PATIENT INSTRUCTIONS: 
 
 
F-face looks uneven A-arms unable to move or move unevenly S-speech slurred or non-existent T-time-call 911 as soon as signs and symptoms begin-DO NOT go Back to bed or wait to see if you get better-TIME IS BRAIN. Warning Signs of HEART ATTACK Call 911 if you have these symptoms: 
? Chest discomfort. Most heart attacks involve discomfort in the center of the chest that lasts more than a few minutes, or that goes away and comes back. It can feel like uncomfortable pressure, squeezing, fullness, or pain. ? Discomfort in other areas of the upper body. Symptoms can include pain or discomfort in one or both arms, the back, neck, jaw, or stomach. ? Shortness of breath with or without chest discomfort. ? Other signs may include breaking out in a cold sweat, nausea, or lightheadedness. Don't wait more than five minutes to call [address]! Fast action can save your life. Calling 911 is almost always the fastest way to get lifesaving treatment. Emergency Medical Services staff can begin treatment when they arrive  up to an hour sooner than if someone gets to the hospital by car. The discharge information has been reviewed with the patient. The patient verbalized understanding. Discharge medications reviewed with the patient and appropriate educational materials and side effects teaching were provided. ___________________________________________________________________________________________________________________________________ Introducing [facility]! [facility] introduce portal paciente MyChart . Ahora se puede acceder a partes de barragan expediente médico, enviar por correo electrónico la oficina de barragan médico y solicitar renovaciones de medicamentos en línea. En barragan navegador de Internet , Duke Christianson a [email or web address] Veronica clic en el usuario por Ronndaniela Mccoyton? Bri ho aquí en la sesión Bridgette Agudelo. Verá la página de registro [place]. Ingrese barragan código de [employer] caitlyn y maryellen aparece a continuación. Usted no tendrá que [employer] código después de negar completado el proceso de registro . Si usted no se inscribe antes de la fecha de caducidad , debe solicitar un nuevo código. · MyChart Código de acceso : [id number] Expires: 10/25/2018 10:59 AM 
 
Ingresa los últimos cuatro dígitos de barragan Número de Seguro Social ( xxxx ) y fecha de nacimiento ( dd / mm / aaaa ) maryellen se indica y veronica clic en Enviar. Usted será llevado a la siguiente página de registro . Crear un ID MyChart . Esta será barragan ID de inicio de sesión de MyChart y no puede ser [place] , por lo que pensar en kori que es Damian Amezquita y fácil de recordar . Crear kori contraseña MyChart . Usted puede cambiar barragan contraseña en cualquier momento . Ingrese barragan Password Reset de preguntas y [place] . [place] se puede utilizar en un momento posterior si usted olvida barragan contraseña. Introduzca barragan dirección de correo electrónico . Urbano Schmitz recibirá kori notificación por correo electrónico cuando la nueva información está disponible en MyChart . Flakita Patee clic en Registrarse. Maryuri Desai bhavin y descargar porciones de barragan expediente médico. 
Veronica clic en el enlace de descarga del menú Resumen para descargar kori copia portátil de barragan información médica . Si tiene [employer] , por favor visite la sección de preguntas frecuentes del sitio web MyChart . Recuerde, MyChart NO es que se utilizará para las necesidades urgentes. Para emergencias médicas , llame al 911 . Ahora disponible en barragan iPhone y Android ! Introducing Herb Cintron As a [employer] patient, I wanted to make you aware of our electronic visit tool called Herb Cintron. SADAR 3D allows you to connect within minutes with a medical provider 24 hours a day, seven days a week via a mobile device or tablet or logging into a secure website from your computer. You can access 1d4 Pty from anywhere in the [place]. A virtual visit might be right for you when you have a simple condition and feel like you just dont want to get out of bed, or cant get away from work for an appointment, when your [facility] is not available (evenings, weekends or holidays), or when youre out of town and need minor care. Electronic visits cost only $49 and if the [employer] provider determines a prescription is needed to treat your condition, one can be electronically transmitted to a nearby pharmacy*. Please take a moment to enroll today if you have not already done so. The enrollment process is free and takes just a few minutes. To enroll, please download the SADAR 3D chloe to your tablet or phone, or visit [email or web address] to enroll on your computer. And, as an [address] [place] [address] patient with a [employer] account, the results of your visits will be scanned into your electronic medical record and your primary care provider will be able to view the scanned results. We urge you to continue to see your regular [facility] for your ongoing medical care. And while your primary care provider may not be the one available when you seek a 1d4 Pty virtual visit, the peace of mind you get from getting a real diagnosis real time can be priceless. For more information on 1d4 Pty, view our Frequently Asked Questions (FAQs) at [email or web address]. Sincerely, 
 
Jose Nagy MD 
Chief Medical Officer Chrissy Samano *:  certain medications cannot be prescribed via 1d4 Pty Unresulted Labs-Please follow up with your PCP about these lab tests Order Current Status NC XR TECHNOLOGIST SERVICE In process Providers Seen During Your Hospitalization Personal Médico Especialidad Teléfono principal de la oficina Claudene Monarch, MD Neurosurgery [phone] [facility] médico de atención primaria (PCP ) Primary Care Physician Office Phone Office Fax Trevor Mobley [phone] Tiene alergias a lo siguiente Sagrario Preston Peanut Hives Documentación recientes Height Weight BMI (IMC) Estado obstétrico Estatus de tabaquísmo 1.575 m 73 kg 29.45 kg/m2 Perimenopausal Current Every Day Smoker Emergency Contacts Name Discharge Info Relation Home Work Mobile Kg Robledo  Son [22] [phone] Patient Belongings The following personal items are in your possession at time of discharge: 
  Dental Appliances: Uppers  Visual Aid: None      Home Medications: None      Clothing: Footwear, Pants, Shirt, Undergarments    Other Valuables: None  Personal Items Sent to Safe: none Please provide this summary of care documentation to your next provider. Signatures-by signing, you are acknowledging that this After Visit Summary has been reviewed with you and you have received a copy. Patient Signature:  ____________________________________________________________ Date:  ____________________________________________________________  
  
Sara Hernandezer Provider Signature:  ____________________________________________________________ Date:  ____________________________________________________________

## 2018-07-31 PROCEDURE — 77030027138 HC INCENT SPIROMETER -A

## 2018-07-31 PROCEDURE — 74011250637 HC RX REV CODE- 250/637: Performed by: NEUROLOGICAL SURGERY

## 2018-07-31 PROCEDURE — 97161 PT EVAL LOW COMPLEX 20 MIN: CPT

## 2018-07-31 PROCEDURE — 77030020255 HC SOL INJ LR 1000ML BG

## 2018-07-31 PROCEDURE — 65270000029 HC RM PRIVATE

## 2018-07-31 PROCEDURE — 97166 OT EVAL MOD COMPLEX 45 MIN: CPT

## 2018-07-31 PROCEDURE — 74011250636 HC RX REV CODE- 250/636: Performed by: NEUROLOGICAL SURGERY

## 2018-07-31 PROCEDURE — 97530 THERAPEUTIC ACTIVITIES: CPT

## 2018-07-31 RX ADMIN — FLUOXETINE 40 MG: 20 CAPSULE ORAL at 08:17

## 2018-07-31 RX ADMIN — Medication 10 ML: at 06:11

## 2018-07-31 RX ADMIN — LISINOPRIL 5 MG: 5 TABLET ORAL at 08:17

## 2018-07-31 RX ADMIN — VANCOMYCIN HYDROCHLORIDE 1250 MG: 10 INJECTION, POWDER, LYOPHILIZED, FOR SOLUTION INTRAVENOUS at 18:33

## 2018-07-31 RX ADMIN — Medication 10 ML: at 05:49

## 2018-07-31 RX ADMIN — OXYCODONE HYDROCHLORIDE 15 MG: 15 TABLET ORAL at 14:06

## 2018-07-31 RX ADMIN — OXYCODONE HYDROCHLORIDE 15 MG: 15 TABLET ORAL at 18:33

## 2018-07-31 RX ADMIN — BUPROPION HYDROCHLORIDE 150 MG: 150 TABLET, EXTENDED RELEASE ORAL at 08:17

## 2018-07-31 RX ADMIN — ACETAMINOPHEN 650 MG: 325 TABLET ORAL at 05:46

## 2018-07-31 RX ADMIN — OXYCODONE HYDROCHLORIDE 15 MG: 15 TABLET ORAL at 08:38

## 2018-07-31 RX ADMIN — VANCOMYCIN HYDROCHLORIDE 1250 MG: 10 INJECTION, POWDER, LYOPHILIZED, FOR SOLUTION INTRAVENOUS at 05:46

## 2018-07-31 RX ADMIN — HYDROCHLOROTHIAZIDE 25 MG: 25 TABLET ORAL at 08:17

## 2018-07-31 RX ADMIN — BUPROPION HYDROCHLORIDE 150 MG: 150 TABLET, EXTENDED RELEASE ORAL at 18:07

## 2018-07-31 RX ADMIN — ATORVASTATIN CALCIUM 20 MG: 10 TABLET, FILM COATED ORAL at 08:17

## 2018-07-31 NOTE — PROGRESS NOTES
1. Patient will complete lower body bathing and dressing with SBA and adaptive equipment as needed. 2. Patient will complete toileting with SBA. 3. Patient will tolerate 23 minutes of OT treatment with 1-2 rest breaks to increase activity tolerance for ADLs. 4. Patient will complete functional transfers independently and adaptive equipment as needed. 5. Patient will complete functional mobility for household distances independently and adaptive equipment as needed Timeframe: 7 visits OCCUPATIONAL THERAPY: Initial Assessment 7/31/2018 INPATIENT: Hospital Day: 2 Payor: Deon / Plan: [address] [place] [address] Avenue / Product Type: Managed Care Medicaid /  
  
NAME/AGE/GENDER: Snehal Gray is a 47 y.o. female PRIMARY DIAGNOSIS:  Spondylolisthesis of lumbar region [M43.16] Lumbar stenosis with neurogenic claudication [M48.062] Spondylolisthesis, lumbar region Spondylolisthesis, lumbar region Procedure(s) (LRB): 
L4-L5 SPINE TRANSFORAMINAL LUMBAR INTERBODY FUSION (TLIF)   (N/A) 1 Day Post-Op ICD-10: Treatment Diagnosis:  
 · Generalized Muscle Weakness (M62.81) Precautions/Allergies: 
   Peanut ASSESSMENT:  
 
Ms. Jane Jeter presents for the above. Upon arrival, pt supine in bed with family at bedside;  present. Pt is AOx4 and agreeable to OT evaluation. Pt endorses 7/10 pain in lower back. Pt reports living with family in 1-story home with 0 steps to enter. Pt reports independence with ADLs, driving, work tasks, and functional mobility at baseline. Pt reports no hx of falls. Pt educated on spinal precautions and use of log roll for bed mobility to edge of bed. Pt verbalized understanding of all precautions. Today, pt performed log roll bed mobility to edge of bed with CGA. Pt's BUE AROM and strength is generally decreased but within functional limits; coordination and sensation are intact. Pt left working with PT.  At this time, pt is functioning below baseline for ADLs and functional mobility. Pt would benefit from skilled OT services to address OT goals and plan of care. This section established at most recent assessment PROBLEM LIST (Impairments causing functional limitations): 1. Decreased ADL/Functional Activities 2. Decreased Transfer Abilities 3. Decreased Ambulation Ability/Technique 4. Increased Pain INTERVENTIONS PLANNED: (Benefits and precautions of occupational therapy have been discussed with the patient.) 1. Activities of daily living training 2. Adaptive equipment training 3. Balance training 4. Clothing management 5. Community reintergration 6. Donning&doffing training 7. Re-evaluation 8. Therapeutic activity 9. Therapeutic exercise TREATMENT PLAN: Frequency/Duration: Follow patient 6x/week to address above goals. Rehabilitation Potential For Stated Goals: Good RECOMMENDED REHABILITATION/EQUIPMENT: (at time of discharge pending progress): Due to the probability of continued deficits (see above) this patient will likely need continued skilled occupational therapy after discharge. Equipment:  TBD OCCUPATIONAL PROFILE AND HISTORY:  
History of Present Injury/Illness (Reason for Referral): 
See H&P Past Medical History/Comorbidities: Ms. Arlyn Rojas  has a past medical history of Anxiety; Chronic pain; Depression; HPV (human papilloma virus) anogenital infection; Hypercholesterolemia; and Hypertension. Ms. Arlyn Rojas  has a past surgical history that includes hx tubal ligation and hx knee arthroscopy (Left, 2008). Social History/Living Environment:  
Home Environment: Private residence # Steps to Enter: 0 One/Two Story Residence: One story Living Alone: No 
Support Systems: Family member(s) Patient Expects to be Discharged to[de-identified] Private residence Current DME Used/Available at Home: None Tub or Shower Type: Tub/Shower combination Prior Level of Function/Work/Activity: 
Independent with ADLs and functional mobility. Personal Factors:   
      Sex:  female Age:  47 y.o. Other factors that influence how disability is experienced by the patient:  Multiple co-morbidities Number of Personal Factors/Comorbidities that affect the Plan of Care: Expanded review of therapy/medical records (1-2):  MODERATE COMPLEXITY ASSESSMENT OF OCCUPATIONAL PERFORMANCE[de-identified]  
Activities of Daily Living:  
Basic ADLs (From Assessment) Complex ADLs (From Assessment) Feeding: Independent Oral Facial Hygiene/Grooming: Independent Bathing: Moderate assistance Upper Body Dressing: Independent Lower Body Dressing: Moderate assistance Toileting: Moderate assistance Instrumental ADL Meal Preparation: Maximum assistance Homemaking: Maximum assistance Grooming/Bathing/Dressing Activities of Daily Living Cognitive Retraining Safety/Judgement: Awareness of environment; Fall prevention Bed/Mat Mobility Rolling: Stand-by assistance Supine to Sit: Stand-by assistance Sit to Stand: Contact guard assistance Bed to Chair: Stand-by assistance Scooting: Stand-by assistance Most Recent Physical Functioning:  
Gross Assessment: 
AROM: Generally decreased, functional 
Strength: Generally decreased, functional 
Sensation: Intact Posture: 
Posture (WDL): Within defined limits Balance: 
Sitting: Intact Standing: Impaired Standing - Static: Good Standing - Dynamic : Fair Bed Mobility: 
Rolling: Stand-by assistance Supine to Sit: Stand-by assistance Scooting: Stand-by assistance Wheelchair Mobility: 
  
Transfers: 
Sit to Stand: Contact guard assistance Stand to Sit: Contact guard assistance Bed to Chair: Stand-by assistance Patient Vitals for the past 6 hrs: 
 BP BP Patient Position SpO2 Pulse 07/31/18 0705 115/74 At rest 95 % 85 Mental Status Neurologic State: Alert Orientation Level: Oriented X4 Cognition: Follows commands Perception: Appears intact Perseveration: No perseveration noted Safety/Judgement: Awareness of environment, Fall prevention Physical Skills Involved: 1. Range of Motion 2. Balance 3. Strength 4. Activity Tolerance 5. Pain (acute) Cognitive Skills Affected (resulting in the inability to perform in a timely and safe manner): 1. Comprehension Psychosocial Skills Affected: 1. Habits/Routines 2. Environmental Adaptation Number of elements that affect the Plan of Care: 5+:  HIGH COMPLEXITY CLINICAL DECISION MAKIN[address] [place] [address] AM-PAC 6 Clicks Daily Activity Inpatient Short Form How much help from another person does the patient currently need. .. Total A Lot A Little None 1. Putting on and taking off regular lower body clothing? [] 1   [x] 2   [] 3   [] 4  
2. Bathing (including washing, rinsing, drying)? [] 1   [x] 2   [] 3   [] 4  
3. Toileting, which includes using toilet, bedpan or urinal?   [] 1   [x] 2   [] 3   [] 4  
4. Putting on and taking off regular upper body clothing? [] 1   [] 2   [] 3   [x] 4  
5. Taking care of personal grooming such as brushing teeth? [] 1   [] 2   [] 3   [x] 4  
6. Eating meals? [] 1   [] 2   [] 3   [x] 4  
© , Trustees of [address] [place] [address], under license to [employer]. All rights reserved Score:  Initial: 21 Most Recent: X (Date: -- ) Interpretation of Tool:  Represents activities that are increasingly more difficult (i.e. Bed mobility, Transfers, Gait). Score 24 23 22-20 19-15 14-10 9-7 6 Modifier CH CI CJ CK CL CM CN   
 
? Self Care:  
  - CURRENT STATUS: CJ - 20%-39% impaired, limited or restricted  - GOAL STATUS: CI - 1%-19% impaired, limited or restricted  - D/C STATUS:  ---------------To be determined--------------- Payor: Deon / Plan: [address] [place] [address] Avenue / Product Type: Managed Care Medicaid /   
 
Medical Necessity:    
· Patient is expected to demonstrate progress in strength, balance, coordination and functional technique to decrease assistance required with ADLs and functional mobility. Reason for Services/Other Comments: 
· Patient continues to require skilled intervention due to medical complications and patient unable to attend/participate in therapy as expected. Use of outcome tool(s) and clinical judgement create a POC that gives a: MODERATE COMPLEXITY  
 
 
 
TREATMENT:  
(In addition to Assessment/Re-Assessment sessions the following treatments were rendered) Pre-treatment Symptoms/Complaints:   
Pain: Initial:  
Pain Intensity 1: 7 Pain Location 1: Back Pain Orientation 1: Lower Pain Intervention(s) 1: Ambulation/Increased Activity  Post Session:  same Assessment/Reassessment only, no treatment provided today Braces/Orthotics/Lines/Etc:  
· IV 
· O2 Device: Room air Treatment/Session Assessment:   
· Response to Treatment:  eval only. · Interdisciplinary Collaboration:  
o Physical Therapist 
o Occupational Therapist 
o Registered Nurse 
o  · After treatment position/precautions:  
o working with PT  
· Compliance with Program/Exercises: Will assess as treatment progresses. · Recommendations/Intent for next treatment session: \"Next visit will focus on advancements to more challenging activities and reduction in assistance provided\". Total Treatment Duration: OT Patient Time In/Time Out Time In: 2756 Time Out: 1045 Mark Lamas OT

## 2018-07-31 NOTE — PROGRESS NOTES
present at bedside during PT and OT session. Sharona Hernandez Patient Relations & Interpreting Services 
c: [phone] / [email or web address] 
[facility] / [place], [facility] 
[email or web address] [facility]

## 2018-07-31 NOTE — PROGRESS NOTES
NS   POD#1 AFEBRILE; T MAX  100.6 DRY DRESSINGS 
NO C/O'S  TODAY;  FEELS WELL 
JACQUES:160 ML 
A/P PT/OT  TODAY MOBILIZE NEEDS TO USE SPIROMETER;  INSTRUCTED Ally Joe MD

## 2018-07-31 NOTE — PROGRESS NOTES
Spiritual Care Visit, initial visit. Visited with patient at bedside.  spoke with patient's brothers in hallway while Selin Rebeca was visiting patient in her room. Patient appears to have good family support. Visit by Karine Lovell, Staff .  Guerita., Th.B., B.A.

## 2018-07-31 NOTE — PROGRESS NOTES
Problem: Mobility Impaired (Adult and Pediatric) Goal: *Acute Goals and Plan of Care (Insert Text) Discharge Goals: 1. Patient will perform bed mobility via log roll with INDEPENDENCE within 7 days. 2. Patient will transfer with INDEPENDENCE within 7 treatment days. 3. Patient will demonstrate GOOD DYNAMIC STANDING balance within 7 day(s). 4. Patient will ambulate 300+ with INDEPENDENCE within 7 days. 5. Patient will verbalize and follow 3/3 spinal precautions 100% of the time with 0 verbal cues within 7 treatment days. PHYSICAL THERAPY: Initial Assessment, AM 7/31/2018 INPATIENT: Hospital Day: 2 Payor: Deon / Plan: [id number] [address] / Product Type: Managed Care Medicaid /   
 
Albanian speaking NAME/AGE/GENDER: Ysabel Chambers is a 47 y.o. female PRIMARY DIAGNOSIS: Spondylolisthesis of lumbar region [M43.16] Lumbar stenosis with neurogenic claudication [M48.062] Spondylolisthesis, lumbar region Spondylolisthesis, lumbar region Procedure(s) (LRB): 
L4-L5 SPINE TRANSFORAMINAL LUMBAR INTERBODY FUSION (TLIF)   (N/A) 1 Day Post-Op ICD-10: Treatment Diagnosis: · Difficulty in walking, Not elsewhere classified (R26.2) · Low Back Pain (M54.5) Precaution/Allergies: 
Peanut ASSESSMENT:  
 
Ms. Karolina Agee is a 47year old female 1 day s/p L4-L5 TLIF with spinal precautions. Patient seen this AM for PT evaluation: presents s/p OT assessment, endorses 7/10 post operative low back pain, and agreeable to evaluation.  present. At baseline, patient lives with family members and is is an independent, community level ambulator. 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 Today, B LE strength functionally 4+/5 with 5/5 distal power, B LE ROM WNL, and sensation is intact to light touch L3-S1 B. Patient educated on spinal precautions. Required CGA with transfers and SBA for ambulation x150ft with no assistive device.  Demonstrated a slow, step-through gait pattern with mildly narrowed base of support and fair+ dynamic balance throughout. Transitioned into bedside chair and educated on plan of care. Ms. Billy Stern is mobilizing well post operatively. Will follow with acute BID plan of care. Anticipate patient will progress quickly toward stated mobility goals. This section established at most recent assessment PROBLEM LIST (Impairments causing functional limitations): 1. Decreased Transfer Abilities 2. Decreased Ambulation Ability/Technique 3. Decreased Balance 4. Decreased Activity Tolerance 5. Decreased Knowledge of Precautions INTERVENTIONS PLANNED: (Benefits and precautions of physical therapy have been discussed with the patient.) 1. Balance Exercise 2. Bed Mobility 3. Family Education 4. Gait Training 5. Home Exercise Program (HEP) 6. Range of Motion (ROM) 7. Therapeutic Activites 8. Therapeutic Exercise/Strengthening 9. Transfer Training 10. Group Therapy TREATMENT PLAN: Frequency/Duration: twice daily for duration of hospital stay Rehabilitation Potential For Stated Goals: Good RECOMMENDED REHABILITATION/EQUIPMENT: (at time of discharge pending progress): Due to the probability of continued deficits (see above) this patient will not likely need continued skilled physical therapy after discharge. Equipment:  
 None at this time HISTORY:  
History of Present Injury/Illness (Reason for Referral): S/p L4-L5 TLIF Past Medical History/Comorbidities: Ms. Billy Stern  has a past medical history of Anxiety; Chronic pain; Depression; HPV (human papilloma virus) anogenital infection; Hypercholesterolemia; and Hypertension. Ms. Billy Stern  has a past surgical history that includes hx tubal ligation and hx knee arthroscopy (Left, 2008). Social History/Living Environment:  
Home Environment: Private residence One/Two Story Residence: One story Living Alone: No 
Support Systems: Family member(s) Patient Expects to be Discharged to[de-identified] Private residence Current DME Used/Available at Home: None Tub or Shower Type: Tub/Shower combination Prior Level of Function/Work/Activity: 
Independent; works; drives Number of Personal Factors/Comorbidities that affect the Plan of Care: 0: LOW COMPLEXITY EXAMINATION:  
Most Recent Physical Functioning:  
Gross Assessment: 
AROM: Within functional limits Strength: Within functional limits Sensation: Intact Posture: 
Posture (WDL): Within defined limits Balance: 
Sitting: Intact Standing: Impaired Standing - Static: Good Standing - Dynamic : Fair Bed Mobility: 
Rolling: Stand-by assistance Supine to Sit: Stand-by assistance Scooting: Stand-by assistance Wheelchair Mobility: 
  
Transfers: 
Sit to Stand: Contact guard assistance Stand to Sit: Contact guard assistance Bed to Chair: Stand-by assistance Gait: 
  
Base of Support: Narrowed Gait Abnormalities: Decreased step clearance; Step to gait Distance (ft): 150 Feet (ft) Assistive Device:  (None) Interventions: Safety awareness training; Tactile cues; Verbal cues Body Structures Involved: 1. Nerves 2. Bones 3. Muscles Body Functions Affected: 1. Neuromusculoskeletal 
2. Movement Related Activities and Participation Affected: 1. Mobility 2. Self Care Number of elements that affect the Plan of Care: 4+: HIGH COMPLEXITY CLINICAL PRESENTATION:  
Presentation: Stable and uncomplicated: LOW COMPLEXITY CLINICAL DECISION MAKING:  
[employer] -PAC 6 Clicks Basic Mobility Inpatient Short Form How much difficulty does the patient currently have. .. Unable A Lot A Little None 1. Turning over in bed (including adjusting bedclothes, sheets and blankets)? [] 1   [] 2   [] 3   [x] 4  
2. Sitting down on and standing up from a chair with arms ( e.g., wheelchair, bedside commode, etc.)   [] 1   [] 2   [x] 3   [] 4  
3.   Moving from lying on back to sitting on the side of the bed? [] 1   [] 2   [] 3   [x] 4 How much help from another person does the patient currently need. .. Total A Lot A Little None 4. Moving to and from a bed to a chair (including a wheelchair)? [] 1   [] 2   [x] 3   [] 4  
5. Need to walk in hospital room? [] 1   [] 2   [x] 3   [] 4  
6. Climbing 3-5 steps with a railing? [] 1   [] 2   [x] 3   [] 4  
© 2007, Trustees of [address] [place] [address] [address] [address], under license to "[employer]. All rights reserved Score:  Initial: 20 Most Recent: 20 (Date: 7/31/18 ) Interpretation of Tool:  Represents activities that are increasingly more difficult (i.e. Bed mobility, Transfers, Gait). Score 24 23 22-20 19-15 14-10 9-7 6 Modifier CH CI CJ CK CL CM CN   
 
? Mobility - Walking and Moving Around:  
  - CURRENT STATUS: CJ - 20%-39% impaired, limited or restricted  - GOAL STATUS: CI - 1%-19% impaired, limited or restricted  - D/C STATUS:  ---------------To be determined--------------- Payor: Deon / Plan: [address] [place] [address] Avenue / Product Type: Managed Care Medicaid /   
 
Medical Necessity:    
· Patient demonstrates good rehab potential due to higher previous functional level. Reason for Services/Other Comments: 
· Patient continues to require skilled intervention due to decreased balance/gait status from baseline. Ervin Connelly Use of outcome tool(s) and clinical judgement create a POC that gives a: Clear prediction of patient's progress: LOW COMPLEXITY  
  
 
 
 
TREATMENT:  
(In addition to Assessment/Re-Assessment sessions the following treatments were rendered) Pre-treatment Symptoms/Complaints:   
Pain: Initial:  
Pain Intensity 1: 7 Pain Location 1: Back Pain Orientation 1: Lower Pain Intervention(s) 1: Ambulation/Increased Activity  Post Session:  7/10 post mobility Assessment/Reassessment only, no treatment provided today Braces/Orthotics/Lines/Etc:  
· IV 
· O2 Device: Room air Treatment/Session Assessment: · Response to Treatment:  See above. · Interdisciplinary Collaboration:  
o Physical Therapist 
o Occupational Therapist 
o Registered Nurse 
o  · After treatment position/precautions:  
o Up in chair 
o Bed alarm/tab alert on 
o Bed/Chair-wheels locked 
o Bed in low position 
o Caregiver at bedside 
o Call light within reach 
o Family at bedside 
o Chair alarm activated and patient educated · Compliance with Program/Exercises: Will assess as treatment progresses. · Recommendations/Intent for next treatment session: \"Next visit will focus on advancements to more challenging activities and reduction in assistance provided\". Total Treatment Duration: PT Patient Time In/Time Out Time In: 1046 Time Out: 1056 Savita Rees DPT

## 2018-07-31 NOTE — PROGRESS NOTES
Problem: Falls - Risk of 
Goal: *Absence of Falls Document Jolynn Kunz Fall Risk and appropriate interventions in the flowsheet. Outcome: Progressing Towards Goal 
Fall Risk Interventions: 
Mobility Interventions: Bed/chair exit alarm, Mechanical lift, PT Consult for mobility concerns, PT Consult for assist device competence Medication Interventions: Bed/chair exit alarm, Assess postural VS orthostatic hypotension, Patient to call before getting OOB, Teach patient to arise slowly, Evaluate medications/consider consulting pharmacy Elimination Interventions: Bed/chair exit alarm, Call light in reach, Elevated toilet seat, Patient to call for help with toileting needs, Toilet paper/wipes in reach, Toileting schedule/hourly rounds

## 2018-07-31 NOTE — PROGRESS NOTES
Problem: Mobility Impaired (Adult and Pediatric) Goal: *Acute Goals and Plan of Care (Insert Text) Discharge Goals: 1. Patient will perform bed mobility via log roll with INDEPENDENCE within 7 days. 2. Patient will transfer with INDEPENDENCE within 7 treatment days. 3. Patient will demonstrate GOOD DYNAMIC STANDING balance within 7 day(s). 4. Patient will ambulate 300+ with INDEPENDENCE within 7 days. 5. Patient will verbalize and follow 3/3 spinal precautions 100% of the time with 0 verbal cues within 7 treatment days. PHYSICAL THERAPY: Daily Note, Treatment Day: Day of Assessment, PM 7/31/2018 INPATIENT: Hospital Day: 2 Payor: Deon / Plan: [id number] / Product Type: Managed Care Medicaid /   
 
Yi speaking NAME/AGE/GENDER: Toney Gerard is a 47 y.o. female PRIMARY DIAGNOSIS: Spondylolisthesis of lumbar region [M43.16] Lumbar stenosis with neurogenic claudication [M48.062] Spondylolisthesis, lumbar region Spondylolisthesis, lumbar region Procedure(s) (LRB): 
L4-L5 SPINE TRANSFORAMINAL LUMBAR INTERBODY FUSION (TLIF)   (N/A) 1 Day Post-Op ICD-10: Treatment Diagnosis: · Difficulty in walking, Not elsewhere classified (R26.2) · Low Back Pain (M54.5) Precaution/Allergies: 
Peanut ASSESSMENT:  
 
Ms. Joselin Fuentes is a 47year old female 1 day s/p L4-L5 TLIF with spinal precautions. Patient seen this AM for PT evaluation: presents s/p OT assessment, endorses 7/10 post operative low back pain, and agreeable to evaluation.  present. At baseline, patient lives with family members and is is an independent, community level ambulator. 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 Today, B LE strength functionally 4+/5 with 5/5 distal power, B LE ROM WNL, and sensation is intact to light touch L3-S1 B. Patient educated on spinal precautions.  Required CGA with transfers and SBA for ambulation x150ft with no assistive device. Demonstrated a slow, step-through gait pattern with mildly narrowed base of support and fair+ dynamic balance throughout. Transitioned into bedside chair and educated on plan of care. Ms. Hafas Connell is mobilizing well post operatively. Will follow with acute BID plan of care. Anticipate patient will progress quickly toward stated mobility goals. PM Note:  present for PM PT treatment. Patient supine on contact and agreeable to physical therapy treatment. She transferred to sitting with stand by assist, stood with CGA and increased ambulation distance to 500' with no assistive device and CGA. Noted antalgic gait with decreased weight bearing to LLE. Patient reports she would like to have a rolling walker at d/c. Good progress with ambulation distance, able to ambulate into restroom and transfer to/from varied surface heights. She returned to bed with SBA a the end of treatment. Will continue therapy efforts. This section established at most recent assessment PROBLEM LIST (Impairments causing functional limitations): 1. Decreased Transfer Abilities 2. Decreased Ambulation Ability/Technique 3. Decreased Balance 4. Decreased Activity Tolerance 5. Decreased Knowledge of Precautions INTERVENTIONS PLANNED: (Benefits and precautions of physical therapy have been discussed with the patient.) 1. Balance Exercise 2. Bed Mobility 3. Family Education 4. Gait Training 5. Home Exercise Program (HEP) 6. Range of Motion (ROM) 7. Therapeutic Activites 8. Therapeutic Exercise/Strengthening 9. Transfer Training 10. Group Therapy TREATMENT PLAN: Frequency/Duration: twice daily for duration of hospital stay Rehabilitation Potential For Stated Goals: Good RECOMMENDED REHABILITATION/EQUIPMENT: (at time of discharge pending progress): Due to the probability of continued deficits (see above) this patient will not likely need continued skilled physical therapy after discharge. Equipment:  
 None at this time HISTORY:  
History of Present Injury/Illness (Reason for Referral): S/p L4-L5 TLIF Past Medical History/Comorbidities: Ms. Shailesh Arroyo  has a past medical history of Anxiety; Chronic pain; Depression; HPV (human papilloma virus) anogenital infection; Hypercholesterolemia; and Hypertension. Ms. Shailesh Arroyo  has a past surgical history that includes hx tubal ligation and hx knee arthroscopy (Left, 2008). Social History/Living Environment:  
Home Environment: Private residence # Steps to Enter: 0 One/Two Story Residence: One story Living Alone: No 
Support Systems: Family member(s) Patient Expects to be Discharged to[de-identified] Private residence Current DME Used/Available at Home: None Tub or Shower Type: Tub/Shower combination Prior Level of Function/Work/Activity: 
Independent; works; drives Number of Personal Factors/Comorbidities that affect the Plan of Care: 0: LOW COMPLEXITY EXAMINATION:  
Most Recent Physical Functioning:  
Gross Assessment: 
  
         
  
Posture: 
  
Balance: 
Sitting: Intact Standing: Impaired Standing - Static: Good Standing - Dynamic : Fair Bed Mobility: 
Rolling: Stand-by assistance Supine to Sit: Stand-by assistance Scooting: Stand-by assistance Wheelchair Mobility: 
  
Transfers: 
Sit to Stand: Contact guard assistance Stand to Sit: Contact guard assistance Interventions: Safety awareness training;Manual cues; Verbal cues; Visual cues Gait: 
  
Base of Support: Narrowed Gait Abnormalities: Decreased step clearance;Trunk sway increased Distance (ft): 500 Feet (ft) Assistive Device:  (none) Interventions: Safety awareness training;Verbal cues Body Structures Involved: 1. Nerves 2. Bones 3. Muscles Body Functions Affected: 1. Neuromusculoskeletal 
2. Movement Related Activities and Participation Affected: 1. Mobility 2. Self Care Number of elements that affect the Plan of Care: 4+: HIGH COMPLEXITY CLINICAL PRESENTATION:  
Presentation: Stable and uncomplicated: LOW COMPLEXITY CLINICAL DECISION MAKIN[address] [place] [address] [address] [address] AM-PAC 6 Clicks Basic Mobility Inpatient Short Form How much difficulty does the patient currently have. .. Unable A Lot A Little None 1. Turning over in bed (including adjusting bedclothes, sheets and blankets)? [] 1   [] 2   [] 3   [x] 4  
2. Sitting down on and standing up from a chair with arms ( e.g., wheelchair, bedside commode, etc.)   [] 1   [] 2   [x] 3   [] 4  
3. Moving from lying on back to sitting on the side of the bed? [] 1   [] 2   [] 3   [x] 4 How much help from another person does the patient currently need. .. Total A Lot A Little None 4. Moving to and from a bed to a chair (including a wheelchair)? [] 1   [] 2   [x] 3   [] 4  
5. Need to walk in hospital room? [] 1   [] 2   [x] 3   [] 4  
6. Climbing 3-5 steps with a railing? [] 1   [] 2   [x] 3   [] 4  
© , Trustees of [address] [place] [address] [address] [address], under license to [employer]. All rights reserved Score:  Initial: 20 Most Recent: 20 (Date: 18 ) Interpretation of Tool:  Represents activities that are increasingly more difficult (i.e. Bed mobility, Transfers, Gait). Score 24 23 22-20 19-15 14-10 9-7 6 Modifier CH CI CJ CK CL CM CN   
 
? Mobility - Walking and Moving Around:  
  - CURRENT STATUS: CJ - 20%-39% impaired, limited or restricted  - GOAL STATUS: CI - 1%-19% impaired, limited or restricted  - D/C STATUS:  ---------------To be determined--------------- Payor: Deon / Plan: [address] [place] [address] Avenue / Product Type: Managed Care Medicaid /   
 
Medical Necessity:    
· Patient demonstrates good rehab potential due to higher previous functional level. Reason for Services/Other Comments: 
· Patient continues to require skilled intervention due to decreased balance/gait status from baseline. Renetta Pryor Use of outcome tool(s) and clinical judgement create a POC that gives a: Clear prediction of patient's progress: LOW COMPLEXITY  
  
 
 
 
TREATMENT:  
(In addition to Assessment/Re-Assessment sessions the following treatments were rendered) Pre-treatment Symptoms/Complaints:   
Pain: Initial:  
Pain Intensity 1: 8  Post Session:  6/10 post mobility Therapeutic Activity: (    10 minutes): Therapeutic activities including Bed transfers, Ambulation on level ground and sit to stand from various surface heights to improve mobility, strength, balance and coordination. Required minimal Safety awareness training;Verbal cues to promote static and dynamic balance in standing and log roll to return to supine. Braces/Orthotics/Lines/Etc:  
· IV 
· O2 Device: Room air Treatment/Session Assessment:   
· Response to Treatment:  See above. · Interdisciplinary Collaboration:  
o Physical Therapist 
o Registered Nurse 
o  · After treatment position/precautions:  
o Supine in bed 
o Bed alarm/tab alert on 
o Bed/Chair-wheels locked 
o Bed in low position 
o Caregiver at bedside 
o Call light within reach · Compliance with Program/Exercises: Will assess as treatment progresses. · Recommendations/Intent for next treatment session: \"Next visit will focus on advancements to more challenging activities and reduction in assistance provided\". Total Treatment Duration: PT Patient Time In/Time Out Time In: 9573 Time Out: 2929 Alexandre Boyer DPT

## 2018-08-01 VITALS
DIASTOLIC BLOOD PRESSURE: 70 MMHG | HEIGHT: 62 IN | WEIGHT: 161 LBS | BODY MASS INDEX: 29.63 KG/M2 | SYSTOLIC BLOOD PRESSURE: 120 MMHG | TEMPERATURE: 98 F | OXYGEN SATURATION: 90 % | RESPIRATION RATE: 18 BRPM | HEART RATE: 81 BPM

## 2018-08-01 PROCEDURE — 74011250636 HC RX REV CODE- 250/636: Performed by: NEUROLOGICAL SURGERY

## 2018-08-01 PROCEDURE — 74011250637 HC RX REV CODE- 250/637: Performed by: NEUROLOGICAL SURGERY

## 2018-08-01 RX ORDER — OXYCODONE AND ACETAMINOPHEN 7.5; 325 MG/1; MG/1
1 TABLET ORAL
Qty: 15 TAB | Refills: 0 | Status: SHIPPED | OUTPATIENT
Start: 2018-08-01 | End: 2018-11-16 | Stop reason: ALTCHOICE

## 2018-08-01 RX ORDER — CEFUROXIME AXETIL 500 MG/1
500 TABLET ORAL 2 TIMES DAILY
Qty: 14 TAB | Refills: 1 | Status: SHIPPED | OUTPATIENT
Start: 2018-08-01 | End: 2018-11-16 | Stop reason: ALTCHOICE

## 2018-08-01 RX ADMIN — FLUOXETINE 40 MG: 20 CAPSULE ORAL at 08:57

## 2018-08-01 RX ADMIN — LISINOPRIL 5 MG: 5 TABLET ORAL at 08:56

## 2018-08-01 RX ADMIN — BUPROPION HYDROCHLORIDE 150 MG: 150 TABLET, EXTENDED RELEASE ORAL at 08:57

## 2018-08-01 RX ADMIN — OXYCODONE HYDROCHLORIDE 15 MG: 15 TABLET ORAL at 05:23

## 2018-08-01 RX ADMIN — VANCOMYCIN HYDROCHLORIDE 1250 MG: 10 INJECTION, POWDER, LYOPHILIZED, FOR SOLUTION INTRAVENOUS at 05:23

## 2018-08-01 RX ADMIN — NAPROXEN 500 MG: 250 TABLET ORAL at 08:56

## 2018-08-01 RX ADMIN — ATORVASTATIN CALCIUM 20 MG: 10 TABLET, FILM COATED ORAL at 08:56

## 2018-08-01 RX ADMIN — HYDROCHLOROTHIAZIDE 25 MG: 25 TABLET ORAL at 08:57

## 2018-08-01 RX ADMIN — SODIUM CHLORIDE, SODIUM LACTATE, POTASSIUM CHLORIDE, AND CALCIUM CHLORIDE 75 ML/HR: 600; 310; 30; 20 INJECTION, SOLUTION INTRAVENOUS at 05:25

## 2018-08-01 RX ADMIN — Medication 10 ML: at 05:23

## 2018-08-01 NOTE — PROGRESS NOTES
Pt declined the use of  at discharge. Discharge instructions and prescriptions given and explained to pt. Pt verbalized understanding. Medication side effects sheet reviewed with pt. Pt to be discharged home, her son will be to the hospital soon.

## 2018-08-01 NOTE — DISCHARGE INSTRUCTIONS
MAY shower (may shower with dressing on)-->NO tub baths    LEAVE dressing on incision for 3 DAYS-->then may remove   (If dressing starts to fall off may re-secure with tape)    NO lifting anything heavier than 5LBS     WEAR your brace if prescribed at all times EXCEPT when in bed, just going to the bathroom, or showering    NO Bending, Lifting or Twisting    Avoid sitting more than 20 - 30 minutes at a time    NO driving until directed by your doctor    DO NOT take any NSAIDS (either prescribed or over the counter until directed    (Aleve, Ibuprofen, Mobic, etc) as this will interfere with bone healing    CALL Dr. Aleksandr Dorsey if:  Fever >100.5  ([phone])               Incision becomes red/ swollen/ opens up             Incision has yellow, thick drainage or an odor             Pain is not managed with prescribed medications             Excessive nausea and/or vomiting    Avoid having pets sleep in bed with you until incision is completely healed       Surgical Drain Care: Care Instructions  Your Care Instructions    After a surgery, fluid may collect inside your body in the surgical area. This makes an infection or other problems more likely. A surgical drain allows the fluid to flow out. The doctor puts a thin, flexible rubber tube into the area of your body where the fluid is likely to collect. The rubber tube carries the fluid outside your body. The most common type of surgical drain carries the fluid into a collection bulb that you empty. This is called a Baljit-Florian (JACQUES) drain. The drain uses suction created by the bulb to pull the fluid from your body into the bulb. The rubber tube will probably be held in place by one or two stitches in your skin. The bulb will probably be attached with a safety pin to your clothes or near the bandage so that it doesn't flip around or pull on the stitches. Another type of drain is called a Penrose drain. This type of drain doesn't have a bulb.  Instead, the end of the tube is open. That allows the fluid to drain onto a dressing taped to your skin. The drain may be kept in place next to your skin with a stitch or a safety pin in the tube. When you first get the drain, the fluid will be bloody. It will change color from red to pink to a light yellow or clear as the wound heals and the fluid starts to go away. Your doctor may give you information on when you no longer need the drain and when it will be removed. Follow-up care is a key part of your treatment and safety. Be sure to make and go to all appointments, and call your doctor if you are having problems. It's also a good idea to know your test results and keep a list of the medicines you take. How can you care for yourself at home? How to empty the bulb of a Baljit-Florian drain  Follow any instructions your doctor gives you. How often you empty the bulb depends on how much fluid is draining. Empty the bulb when it is half full. 1. Wash your hands with soap and water. 2. Take the plug out of the bulb. 3. Empty the bulb. If your doctor asks you to measure the fluid, empty the fluid into a measuring cup, and write down the color and how much you collected. Your doctor will want to know this information. 4. Clean the plug with alcohol. 5. Squeeze the bulb until it is flat. This removes all the air from the bulb. You may need to put the bulb on a table or a counter to flatten it. 6. Keep the bulb flat, and put the plug in. The bulb should stay flat after you put the plug back in. This creates the suction that pulls the fluid into the bulb. 7. Empty the fluid into the toilet. 8. Wash your hands. How to change the dressing around your surgical drain  You may have a dressing (bandage). The dressing is often made of gauze pads held on with tape. Your doctor will tell you how often to change it. 1. Wash your hands with soap and water. 2. Take off the dressing from around the drain.   3. Clean the drain site and the skin around it with soap and water. Use gauze or a cotton swab. 4. When the site is dry, put on a new dressing. The way your dressing is put on depends on what kind of drain you have. You will get instructions for your type of drain. 5. Wash your hands again with soap and water. Your doctor may ask you to keep track of your dressing changes. Write down the time of day and the amount and color of the fluid on the dressing. How to help prevent clogs in your surgical drain  Squeezing or \"milking\" the tube of your surgical drain can help prevent clogs so that it drains correctly. Your doctor will tell you when you need to do this. In general, you do this when:  · You see a clot in the tube that prevents fluid from draining. The clot may look like a dark, stringy lining. · You see fluid leaking around the tube where it goes into the skin. Follow these steps for milking the tube. 1. Use one hand to hold and pinch the tube where it leaves the skin. 2. With the thumb and first finger of your other hand, pinch the tube just below where you're holding it. 3. Slowly and firmly push your thumb and first finger down the tubing toward the end of the tube. 4. Repeat this as many times as needed to move the clot. If you have a Baljit-Florian (JACQUES) drain, the clot should move down the tube and into the bulb. If you have a Penrose drain, the clot should move into the dressing. When should you call for help? Call your doctor now or seek immediate medical care if:    · You have signs of infection, such as:  ¨ Increased pain, swelling, warmth, or redness around the area. ¨ Red streaks leading from the area. ¨ Pus draining from the area.   ¨ A fever.     · You see a sudden change in the color or smell of the drainage.     · The tube is coming loose where it leaves your skin.    Watch closely for changes in your health, and be sure to contact your doctor if:    · You see a lot of fluid around the drain.     · You cannot remove a clot from the tube by milking the tube. Where can you learn more? Go to http://mitul-fauzia.info/. Enter K117 in the search box to learn more about \"Surgical Drain Care: Care Instructions. \"  Current as of: January 10, 2018  Content Version: 11.7  © 0456-2909 [employer]. Care instructions adapted under license by [employer] (which disclaims liability or warranty for this information). If you have questions about a medical condition or this instruction, always ask your healthcare professional. [address] any warranty or liability for your use of this information. DISCHARGE SUMMARY from Nurse    PATIENT INSTRUCTIONS:    After general anesthesia or intravenous sedation, for 24 hours or while taking prescription Narcotics:  · Limit your activities  · Do not drive and operate hazardous machinery  · Do not make important personal or business decisions  · Do  not drink alcoholic beverages  · If you have not urinated within 8 hours after discharge, please contact your surgeon on call. Report the following to your surgeon:  · Excessive pain, swelling, redness or odor of or around the surgical area  · Temperature over 100.5  · Nausea and vomiting lasting longer than 4 hours or if unable to take medications  · Any signs of decreased circulation or nerve impairment to extremity: change in color, persistent  numbness, tingling, coldness or increase pain  · Any questions    What to do at Home:  Recommended activity: See surgical instructions, diet as tolerated. *  Please give a list of your current medications to your Primary Care Provider. *  Please update this list whenever your medications are discontinued, doses are      changed, or new medications (including over-the-counter products) are added. *  Please carry medication information at all times in case of emergency situations.     These are general instructions for a healthy lifestyle:    No smoking/ No tobacco products/ Avoid exposure to second hand smoke  Surgeon General's Warning:  Quitting smoking now greatly reduces serious risk to your health. Obesity, smoking, and sedentary lifestyle greatly increases your risk for illness    A healthy diet, regular physical exercise & weight monitoring are important for maintaining a healthy lifestyle    You may be retaining fluid if you have a history of heart failure or if you experience any of the following symptoms:  Weight gain of 3 pounds or more overnight or 5 pounds in a week, increased swelling in our hands or feet or shortness of breath while lying flat in bed. Please call your doctor as soon as you notice any of these symptoms; do not wait until your next office visit. Recognize signs and symptoms of STROKE:    F-face looks uneven    A-arms unable to move or move unevenly    S-speech slurred or non-existent    T-time-call 911 as soon as signs and symptoms begin-DO NOT go       Back to bed or wait to see if you get better-TIME IS BRAIN. Warning Signs of HEART ATTACK     Call 911 if you have these symptoms:   Chest discomfort. Most heart attacks involve discomfort in the center of the chest that lasts more than a few minutes, or that goes away and comes back. It can feel like uncomfortable pressure, squeezing, fullness, or pain.  Discomfort in other areas of the upper body. Symptoms can include pain or discomfort in one or both arms, the back, neck, jaw, or stomach.  Shortness of breath with or without chest discomfort.  Other signs may include breaking out in a cold sweat, nausea, or lightheadedness. Don't wait more than five minutes to call 911 - MINUTES MATTER! Fast action can save your life. Calling 911 is almost always the fastest way to get lifesaving treatment. Emergency Medical Services staff can begin treatment when they arrive -- up to an hour sooner than if someone gets to the hospital by car.      The discharge information has been reviewed with the patient. The patient verbalized understanding. Discharge medications reviewed with the patient and appropriate educational materials and side effects teaching were provided.   ___________________________________________________________________________________________________________________________________

## 2018-08-01 NOTE — DISCHARGE SUMMARY
Berta Lee  MR#: [id number]  : 1965  ACCOUNT #: [de-identified]   ADMIT DATE: 2018  DISCHARGE DATE:     DISCHARGE  DIAGNOSES:  1. Spondylolisthesis and spinal stenosis L4-5.  2.  Depression. 3.  Hypertension. 4.  Human papillomavirus infection. 5.  Anxiety. 6.  Hypercholesterolemia. 7.  PEANUT ALLERGY. OPERATIONS AND PROCEDURES:  L4-L5 transforaminal lumbar interbody fusion on . COMPLICATIONS:  None. DISCHARGE CONDITION:  Improved. HISTORY OF PRESENT ILLNESS:  A 50-year-old lady with intractable lower extremity pain, difficulty with ambulation and walking, and weakness. MRI scan was positive for severe spinal stenosis and grade I spondylolisthesis at L4-5 and the patient was admitted for surgery as conservative measures have failed. PAST MEDICAL HISTORY:  Listed above. ALLERGIES:  PEANUTS ONLY. Exam revealed normal strength, sensation, and reflexes with an antalgic flexed forward gait. No other abnormalities. HOSPITAL COURSE:  She was taken to the operating room on  and underwent successful L4-5 transforaminal lumbar interbody fusion. She did well postoperatively. Completed physical therapy over the ensuing 48 hours and was discharged home for outpatient followup. Her lumbar drain was still putting out large amounts. Therefore, she was discharged home with her drain. Her legs felt better postoperatively. DISCHARGE INSTRUCTIONS:  Diet:  Regular. Activity:  As tolerated. No heavy lifting, bending, or automobile driving. Showers are permissible, but no baths. The patient will contact the physician if she develops any fever or neurological deficiency. Return visit to the office in 5 days for drain pull and wound check.     DISCHARGE MEDICATIONS:  Include admission medicines plus Percocet 7.5/325 q.8 hours p.r.n. and Ceftin 500 mg b.i.d. for drain prophylaxis. DISCHARGE CONDITION:  Improved. DISPOSITION:  Discharged home for outpatient followup.       MD ORALIA Wang/YESSICA  D: 08/01/2018 07:43     T: 08/01/2018 08:32  JOB #: [id number]

## 2018-08-01 NOTE — PROGRESS NOTES
Problem: Falls - Risk of 
Goal: *Absence of Falls Document Aleida Carey Fall Risk and appropriate interventions in the flowsheet. Outcome: Progressing Towards Goal 
Fall Risk Interventions: 
Mobility Interventions: Bed/chair exit alarm, Communicate number of staff needed for ambulation/transfer, OT consult for ADLs, Patient to call before getting OOB, PT Consult for mobility concerns, PT Consult for assist device competence Medication Interventions: Bed/chair exit alarm, Evaluate medications/consider consulting pharmacy, Patient to call before getting OOB Elimination Interventions: Bed/chair exit alarm, Call light in reach, Patient to call for help with toileting needs, Toileting schedule/hourly rounds

## 2018-09-06 ENCOUNTER — HOSPITAL ENCOUNTER (OUTPATIENT)
Dept: GENERAL RADIOLOGY | Age: 53
Discharge: HOME | End: 2018-09-06
Payer: COMMERCIAL

## 2018-09-06 DIAGNOSIS — M48.062 LUMBAR STENOSIS WITH NEUROGENIC CLAUDICATION: ICD-10-CM

## 2018-09-06 PROCEDURE — 72100 X-RAY EXAM L-S SPINE 2/3 VWS: CPT

## 2018-11-15 NOTE — THERAPY DISCHARGE
Rivera Silveira  : 1965  Payor: [id number] / Plan: [address] / Product Type: Medicaid /  [address]  at [facility]  [address] [facility], [address]  [phone]   [phone]                OUTPATIENT PHYSICAL THERAPY:Discontinuation Summary 11/15/2018    ICD-10: Treatment Diagnosis:        Lumbago with sciatica, right side (M54.41)    Lumbago with sciatica, left side (M54.42)      PRECAUTIONS/ALLERGIES:   Peanut     FALL RISK SCORE: 1 (? 5 = High Risk)    MD ORDERS: Eval and Treat  MEDICAL/REFERRING DIAGNOSIS:  Spinal stenosis, lumbar region without neurogenic claudication [M48.061]  Low back pain [M54.5]    DATE OF ONSET: >2 months ago    REFERRING PHYSICIAN: Candy Thomas NP    RETURN PHYSICIAN APPOINTMENT: 2018     INITIAL ASSESSMENT: Rivera Silveira has been seen in physical therapy from 18 to 19 for 1 visits. Treatment has been discontinued at this time due to patient failing to return for additional treatment. The below goals were met prior to discontinuation. Some goals were not met due to failure to return to therapy. Thank you for this referral.        PROBLEM LIST (Impacting functional limitations):  1. Decreased Strength  2. Decreased ADL/Functional Activities  3. Decreased Transfer Abilities  4. Decreased Ambulation Ability/Technique  5. Decreased Balance  6. Increased Pain  7. Decreased Activity Tolerance  8. Increased Fatigue  9. Increased Shortness of Breath  10. Decreased Flexibility/Joint Mobility  11. Decreased Perkins with Home Exercise Program INTERVENTIONS PLANNED:  1. Balance Exercise  2. Bed Mobility  3. Cold  4. Cryotherapy  5. Electrical Stimulation  6. Family Education  7. Gait Training  8. Heat  9. Home Exercise Program (HEP)  10. Manual Therapy  11. Neuromuscular Re-education/Strengthening  12.  Range of Motion (ROM)  13. Therapeutic Activites  14. Therapeutic Exercise/Strengthening  15. Transfer Training  16. Mechanical traction  17. Aquatic Therapy   TREATMENT PLAN:  Effective Dates: 6/18/2018 TO 9/16/2018 (90 days). Frequency/Duration: 2 times a week for 90 Days    GOALS: (Goals have been discussed and agreed upon with patient.)-NOTE MET DUE TO FAILURE TO RETURN TO THERAPY  Long Term Goals 12 weeks   1. Jelena Fischer will demonstrate an 15 point improvement on the Oswestry to show improvement in function. 2. Jelena Fischer will report 0/10 pain during ADLs to improve QOL. [address] [place] [address] will demonstrate >=4+/5 LE strength on manual muscle testing to improve functional mobility. [address] [place] [address] will be able to perform SLS >15 seconds bilaterally to help with gait and improve balance  5. Jelena Fischer will be able to demonstrate safe lifting and transfer mechanics without cueing for improved safety with home, childcare, and community activities.

## 2018-12-04 ENCOUNTER — HOSPITAL ENCOUNTER (OUTPATIENT)
Dept: GENERAL RADIOLOGY | Age: 53
Discharge: HOME | End: 2018-12-04
Payer: COMMERCIAL

## 2018-12-04 DIAGNOSIS — M43.16 SPONDYLOLISTHESIS AT L4-L5 LEVEL: ICD-10-CM

## 2018-12-04 PROCEDURE — 72100 X-RAY EXAM L-S SPINE 2/3 VWS: CPT

## 2022-03-19 PROBLEM — M43.16 SPONDYLOLISTHESIS AT L4-L5 LEVEL: Status: ACTIVE | Noted: 2018-07-12

## 2022-03-19 PROBLEM — M48.062 LUMBAR STENOSIS WITH NEUROGENIC CLAUDICATION: Status: ACTIVE | Noted: 2018-05-30

## 2022-03-19 PROBLEM — M43.16 SPONDYLOLISTHESIS, LUMBAR REGION: Status: ACTIVE | Noted: 2018-07-30

## 2022-03-19 PROBLEM — M54.50 LOW BACK PAIN: Status: ACTIVE | Noted: 2018-05-30

## 2022-07-17 ENCOUNTER — APPOINTMENT (OUTPATIENT)
Dept: GENERAL RADIOLOGY | Age: 57
End: 2022-07-17
Payer: COMMERCIAL

## 2022-07-17 ENCOUNTER — HOSPITAL ENCOUNTER (EMERGENCY)
Age: 57
Discharge: HOME | End: 2022-07-17
Attending: EMERGENCY MEDICINE
Payer: COMMERCIAL

## 2022-07-17 VITALS
RESPIRATION RATE: 18 BRPM | WEIGHT: 170 LBS | HEART RATE: 90 BPM | OXYGEN SATURATION: 97 % | HEIGHT: 65 IN | TEMPERATURE: 98.7 F | BODY MASS INDEX: 28.32 KG/M2 | SYSTOLIC BLOOD PRESSURE: 128 MMHG | DIASTOLIC BLOOD PRESSURE: 87 MMHG

## 2022-07-17 DIAGNOSIS — G89.29 CHRONIC BILATERAL LOW BACK PAIN WITH SCIATICA, SCIATICA LATERALITY UNSPECIFIED: Primary | ICD-10-CM

## 2022-07-17 DIAGNOSIS — M54.40 CHRONIC BILATERAL LOW BACK PAIN WITH SCIATICA, SCIATICA LATERALITY UNSPECIFIED: Primary | ICD-10-CM

## 2022-07-17 DIAGNOSIS — N30.01 ACUTE CYSTITIS WITH HEMATURIA: ICD-10-CM

## 2022-07-17 LAB
ALBUMIN SERPL-MCNC: 3.9 G/DL (ref 3.5–5)
ALBUMIN/GLOB SERPL: 1 {RATIO} (ref 1.2–3.5)
ALP SERPL-CCNC: 78 U/L (ref 50–136)
ALT SERPL-CCNC: 36 U/L (ref 12–65)
ANION GAP SERPL CALC-SCNC: 4 MMOL/L (ref 7–16)
APPEARANCE UR: CLEAR
AST SERPL-CCNC: 17 U/L (ref 15–37)
BACTERIA URNS QL MICRO: NEGATIVE /HPF
BASOPHILS # BLD: 0.1 K/UL (ref 0–0.2)
BASOPHILS NFR BLD: 2 % (ref 0–2)
BILIRUB SERPL-MCNC: 0.6 MG/DL (ref 0.2–1.1)
BILIRUB UR QL: NEGATIVE
BILIRUB UR QL: NEGATIVE
BUN SERPL-MCNC: 14 MG/DL (ref 6–23)
CALCIUM SERPL-MCNC: 9.2 MG/DL (ref 8.3–10.4)
CASTS URNS QL MICRO: ABNORMAL /LPF
CHLORIDE SERPL-SCNC: 110 MMOL/L (ref 98–107)
CO2 SERPL-SCNC: 25 MMOL/L (ref 21–32)
COLOR UR: ABNORMAL
CREAT SERPL-MCNC: 0.6 MG/DL (ref 0.6–1)
DIFFERENTIAL METHOD BLD: NORMAL
EOSINOPHIL # BLD: 0.2 K/UL (ref 0–0.8)
EOSINOPHIL NFR BLD: 3 % (ref 0.5–7.8)
EPI CELLS #/AREA URNS HPF: ABNORMAL /HPF
ERYTHROCYTE [DISTWIDTH] IN BLOOD BY AUTOMATED COUNT: 12.9 % (ref 11.9–14.6)
GLOBULIN SER CALC-MCNC: 3.9 G/DL (ref 2.3–3.5)
GLUCOSE SERPL-MCNC: 103 MG/DL (ref 65–100)
GLUCOSE UR QL STRIP.AUTO: NEGATIVE MG/DL
GLUCOSE UR STRIP.AUTO-MCNC: NEGATIVE MG/DL
HCT VFR BLD AUTO: 44 % (ref 35.8–46.3)
HGB BLD-MCNC: 14.4 G/DL (ref 11.7–15.4)
HGB UR QL STRIP: NEGATIVE
IMM GRANULOCYTES # BLD AUTO: 0 K/UL (ref 0–0.5)
IMM GRANULOCYTES NFR BLD AUTO: 1 % (ref 0–5)
KETONES UR QL STRIP.AUTO: NEGATIVE MG/DL
KETONES UR-MCNC: NEGATIVE MG/DL
LEUKOCYTE ESTERASE UR QL STRIP.AUTO: ABNORMAL
LEUKOCYTE ESTERASE UR QL STRIP: ABNORMAL
LIPASE SERPL-CCNC: 171 U/L (ref 73–393)
LYMPHOCYTES # BLD: 1.8 K/UL (ref 0.5–4.6)
LYMPHOCYTES NFR BLD: 28 % (ref 13–44)
MCH RBC QN AUTO: 30.4 PG (ref 26.1–32.9)
MCHC RBC AUTO-ENTMCNC: 32.7 G/DL (ref 31.4–35)
MCV RBC AUTO: 92.8 FL (ref 79.6–97.8)
MONOCYTES # BLD: 0.5 K/UL (ref 0.1–1.3)
MONOCYTES NFR BLD: 8 % (ref 4–12)
NEUTS SEG # BLD: 3.8 K/UL (ref 1.7–8.2)
NEUTS SEG NFR BLD: 58 % (ref 43–78)
NITRITE UR QL STRIP.AUTO: NEGATIVE
NITRITE UR QL: NEGATIVE
NRBC # BLD: 0 K/UL (ref 0–0.2)
PH UR STRIP: 6.5 [PH] (ref 5–9)
PH UR: 6.5 [PH] (ref 5–9)
PLATELET # BLD AUTO: 273 K/UL (ref 150–450)
PMV BLD AUTO: 10.7 FL (ref 9.4–12.3)
POTASSIUM SERPL-SCNC: 4 MMOL/L (ref 3.5–5.1)
PROT SERPL-MCNC: 7.8 G/DL (ref 6.3–8.2)
PROT UR QL: NEGATIVE MG/DL
PROT UR STRIP-MCNC: NEGATIVE MG/DL
RBC # BLD AUTO: 4.74 M/UL (ref 4.05–5.2)
RBC # UR STRIP: ABNORMAL /UL
RBC #/AREA URNS HPF: ABNORMAL /HPF
SERVICE CMNT-IMP: ABNORMAL
SODIUM SERPL-SCNC: 139 MMOL/L (ref 136–145)
SP GR UR REFRACTOMETRY: <1.005 (ref 1–1.02)
SP GR UR: <=1.005 (ref 1–1.02)
UROBILINOGEN UR QL STRIP.AUTO: 0.2 EU/DL (ref 0.2–1)
UROBILINOGEN UR QL: 0.2 EU/DL (ref 0.2–1)
WBC # BLD AUTO: 6.4 K/UL (ref 4.3–11.1)
WBC URNS QL MICRO: ABNORMAL /HPF

## 2022-07-17 PROCEDURE — 6360000002 HC RX W HCPCS: Performed by: PHYSICIAN ASSISTANT

## 2022-07-17 PROCEDURE — 83690 ASSAY OF LIPASE: CPT

## 2022-07-17 PROCEDURE — 96374 THER/PROPH/DIAG INJ IV PUSH: CPT

## 2022-07-17 PROCEDURE — 72100 X-RAY EXAM L-S SPINE 2/3 VWS: CPT

## 2022-07-17 PROCEDURE — 96375 TX/PRO/DX INJ NEW DRUG ADDON: CPT

## 2022-07-17 PROCEDURE — 99284 EMERGENCY DEPT VISIT MOD MDM: CPT

## 2022-07-17 PROCEDURE — 81001 URINALYSIS AUTO W/SCOPE: CPT

## 2022-07-17 PROCEDURE — 81003 URINALYSIS AUTO W/O SCOPE: CPT

## 2022-07-17 PROCEDURE — 6370000000 HC RX 637 (ALT 250 FOR IP): Performed by: PHYSICIAN ASSISTANT

## 2022-07-17 PROCEDURE — 80053 COMPREHEN METABOLIC PANEL: CPT

## 2022-07-17 PROCEDURE — 85025 COMPLETE CBC W/AUTO DIFF WBC: CPT

## 2022-07-17 RX ORDER — NAPROXEN 500 MG/1
500 TABLET ORAL 2 TIMES DAILY WITH MEALS
Qty: 60 TABLET | Refills: 0 | Status: SHIPPED | OUTPATIENT
Start: 2022-07-17 | End: 2022-07-24

## 2022-07-17 RX ORDER — NITROFURANTOIN 25; 75 MG/1; MG/1
100 CAPSULE ORAL 2 TIMES DAILY
Qty: 10 CAPSULE | Refills: 0 | Status: SHIPPED | OUTPATIENT
Start: 2022-07-17 | End: 2022-07-22

## 2022-07-17 RX ORDER — METHYLPREDNISOLONE SODIUM SUCCINATE 125 MG/2ML
125 INJECTION, POWDER, LYOPHILIZED, FOR SOLUTION INTRAMUSCULAR; INTRAVENOUS
Status: COMPLETED | OUTPATIENT
Start: 2022-07-17 | End: 2022-07-17

## 2022-07-17 RX ORDER — METHOCARBAMOL 500 MG/1
750 TABLET, FILM COATED ORAL
Status: COMPLETED | OUTPATIENT
Start: 2022-07-17 | End: 2022-07-17

## 2022-07-17 RX ORDER — KETOROLAC TROMETHAMINE 30 MG/ML
30 INJECTION, SOLUTION INTRAMUSCULAR; INTRAVENOUS
Status: COMPLETED | OUTPATIENT
Start: 2022-07-17 | End: 2022-07-17

## 2022-07-17 RX ORDER — TIZANIDINE 4 MG/1
4 TABLET ORAL EVERY 8 HOURS PRN
Qty: 15 TABLET | Refills: 0 | Status: SHIPPED | OUTPATIENT
Start: 2022-07-17 | End: 2022-07-22

## 2022-07-17 RX ADMIN — METHYLPREDNISOLONE SODIUM SUCCINATE 125 MG: 125 INJECTION, POWDER, FOR SOLUTION INTRAMUSCULAR; INTRAVENOUS at 08:52

## 2022-07-17 RX ADMIN — METHOCARBAMOL TABLETS 750 MG: 500 TABLET, COATED ORAL at 08:52

## 2022-07-17 RX ADMIN — KETOROLAC TROMETHAMINE 30 MG: 30 INJECTION, SOLUTION INTRAMUSCULAR; INTRAVENOUS at 08:52

## 2022-07-17 ASSESSMENT — ENCOUNTER SYMPTOMS
BACK PAIN: 1
SHORTNESS OF BREATH: 0
VOMITING: 0
NAUSEA: 0
ABDOMINAL PAIN: 0

## 2022-07-17 ASSESSMENT — PAIN SCALES - GENERAL
PAINLEVEL_OUTOF10: 7
PAINLEVEL_OUTOF10: 10
PAINLEVEL_OUTOF10: 4

## 2022-07-17 ASSESSMENT — PAIN DESCRIPTION - LOCATION: LOCATION: BACK

## 2022-07-17 ASSESSMENT — PAIN DESCRIPTION - ORIENTATION: ORIENTATION: LOWER

## 2022-07-17 ASSESSMENT — PAIN - FUNCTIONAL ASSESSMENT: PAIN_FUNCTIONAL_ASSESSMENT: 0-10

## 2022-07-17 NOTE — ED TRIAGE NOTES
Patient ambulatory to triage with mask in place. Patient reports back pain that radiates to her abd x 3 days. Pt also reports diarrhea and fever/chills. Denies n/v, or urinary sx.

## 2022-07-17 NOTE — ED PROVIDER NOTES
the ED.     Results Reviewed:      Recent Results (from the past 24 hour(s))  -CBC with Diff:   Collection Time: 07/17/22  7:40 AM       Result                      Value             Ref Range           WBC                         6.4               4.3 - 11.1 K*       RBC                         4.74              4.05 - 5.2 M*       Hemoglobin                  14.4              11.7 - 15.4 *       Hematocrit                  44.0              35.8 - 46.3 %       MCV                         92.8              79.6 - 97.8 *       MCH                         30.4              26.1 - 32.9 *       MCHC                        32.7              31.4 - 35.0 *       RDW                         12.9              11.9 - 14.6 %       Platelets                   273               150 - 450 K/*       MPV                         10.7              9.4 - 12.3 FL       nRBC                        0.00              0.0 - 0.2 K/*       Differential Type           AUTOMATED                             Seg Neutrophils             58                43 - 78 %           Lymphocytes                 28                13 - 44 %           Monocytes                   8                 4.0 - 12.0 %        Eosinophils %               3                 0.5 - 7.8 %         Basophils                   2                 0.0 - 2.0 %         Immature Granulocytes       1                 0.0 - 5.0 %         Segs Absolute               3.8               1.7 - 8.2 K/*       Absolute Lymph #            1.8               0.5 - 4.6 K/*       Absolute Mono #             0.5               0.1 - 1.3 K/*       Absolute Eos #              0.2               0.0 - 0.8 K/*       Basophils Absolute          0.1               0.0 - 0.2 K/*       Absolute Immature Gran*     0.0               0.0 - 0.5 K/*  -CMP:   Collection Time: 07/17/22  7:40 AM       Result                      Value             Ref Range           Sodium                      139               136 - 145 mm*       Potassium                   4.0               3.5 - 5.1 mm*       Chloride                    110 (H)           98 - 107 mmo*       CO2                         25                21 - 32 mmol*       Anion Gap                   4 (L)             7 - 16 mmol/L       Glucose                     103 (H)           65 - 100 mg/*       BUN                         14                6 - 23 MG/DL        CREATININE                  0.60              0.6 - 1.0 MG*       GFR         >60               >60 ml/min/1*       GFR Non- Americ*     >60               >60 ml/min/1*       Calcium                     9.2               8.3 - 10.4 M*       Total Bilirubin             0.6               0.2 - 1.1 MG*       ALT                         36                12 - 65 U/L         AST                         17                15 - 37 U/L         Alk Phosphatase             78                50 - 136 U/L        Total Protein               7.8               6.3 - 8.2 g/*       Albumin                     3.9               3.5 - 5.0 g/*       Globulin                    3.9 (H)           2.3 - 3.5 g/*       Albumin/Globulin Ratio      1.0 (L)           1.2 - 3.5      -Lipase:   Collection Time: 07/17/22  7:40 AM       Result                      Value             Ref Range           Lipase                      171               73 - 393 U/L   -POCT Urinalysis no Micro:   Collection Time: 07/17/22  8:03 AM       Result                      Value             Ref Range           Specific Gravity, Urin*     <=1.005           1.001 - 1.02*       pH, Urine, POC              6.5               5.0 - 9.0           Protein, Urine, POC         Negative          NEG mg/dL           Glucose, UA POC             Negative          NEG mg/dL           KETONES, Urine, POC         Negative          NEG mg/dL           Bilirubin, Urine, POC       Negative          NEG                 Blood, UA POC               Trace Hgb (A) NEG                 URINE UROBILINOGEN POC      0.2               0.2 - 1.0 EU*       Nitrate, Urine, POC         Negative          NEG                 Leukocyte Est, UA POC       TRACE (A)         NEG                 Performed by:                                                 Abel Petersenr  -Urinalysis w rflx microscopic:   Collection Time: 07/17/22  8:07 AM       Result                      Value             Ref Range           Color, UA                   YELLOW/STRAW                          Appearance                  CLEAR                                 Specific Gravity, UA        <1.005            1.001 - 1.023       pH, Urine                   6.5               5.0 - 9.0           Protein, UA                 Negative          NEG mg/dL           Glucose, UA                 Negative          mg/dL               Ketones, Urine              Negative          NEG mg/dL           Bilirubin Urine             Negative          NEG                 Blood, Urine                Negative          NEG                 Urobilinogen, Urine         0.2               0.2 - 1.0 EU*       Nitrite, Urine              Negative          NEG                 Leukocyte Esterase, Ur*     SMALL (A)         NEG                 WBC, UA                     10-20 (A)         NORM /hpf           RBC, UA                     0-5 (A)           NORM /hpf           Epithelial Cells UA         5-10 (A)          NORM /hpf           BACTERIA, URINE             Negative (A)      NORM /hpf           Casts                       0-2 (A)           NORM /lpf        I discussed the results of all labs, procedures, radiographs, and treatments with the patient and available family. Treatment plan is agreed upon and the patient is ready for discharge. All voiced understanding of the discharge plan and medication instructions or changes as appropriate. Questions about treatment in the ED were answered.   All were encouraged to return should symptoms worsen or new problems develop. Patient Progress  Patient progress: stable       Orders Placed This Encounter   Procedures    XR LUMBAR SPINE (2-3 VIEWS)    CBC with Diff    CMP    Lipase    Urinalysis w rflx microscopic    Diet NPO    POCT Urine Dipstick    POCT Urinalysis no Micro    Saline lock IV        Rosa Tobin is a 55 y.o. female who presents to the Emergency Department with chief complaint of    Chief Complaint   Patient presents with    Abdominal Pain      Patient presents here today with low back pain radiating down her legs and her around into her abdomen. She states she is had a procedure on her lower back 4 years ago and has not had any issues up until today. She states she gets intermittent sharp pains that is intense enough to make her catch her breath. She states she took some Tylenol and it did not really help. She states this is day 3 or 4 of this and it seems to be worsening. Patient reports laying flat is one of the most uncomfortable things. She denies any fevers, skin changes, numbness or tingling, loss of bowel or bladder control, saddle paresthesias, or other symptoms at this time. The history is provided by the patient. Review of Systems   Constitutional:  Negative for chills and fever. Respiratory:  Negative for shortness of breath. Cardiovascular:  Negative for chest pain. Gastrointestinal:  Negative for abdominal pain, nausea and vomiting. Genitourinary:  Negative for dysuria, frequency and urgency. Musculoskeletal:  Positive for back pain. Negative for gait problem and neck pain. Skin:  Negative for rash. Neurological:  Negative for dizziness, syncope and headaches. Psychiatric/Behavioral:  Negative for agitation and behavioral problems. All other systems reviewed and are negative.     Past Medical History:   Diagnosis Date    Anxiety     Chronic pain     back    Depression     HPV (human papilloma virus) anogenital infection Refill: Capillary refill takes less than 2 seconds. Neurological:      General: No focal deficit present. Mental Status: She is alert and oriented to person, place, and time. Psychiatric:         Mood and Affect: Mood normal.         Behavior: Behavior normal.        Procedures      Labs Reviewed   COMPREHENSIVE METABOLIC PANEL - Abnormal; Notable for the following components:       Result Value    Chloride 110 (*)     Anion Gap 4 (*)     Glucose 103 (*)     Globulin 3.9 (*)     Albumin/Globulin Ratio 1.0 (*)     All other components within normal limits   URINALYSIS - Abnormal; Notable for the following components:    Leukocyte Esterase, Urine SMALL (*)     WBC, UA 10-20 (*)     RBC, UA 0-5 (*)     Epithelial Cells UA 5-10 (*)     BACTERIA, URINE Negative (*)     Casts 0-2 (*)     All other components within normal limits   POCT URINALYSIS DIPSTICK - Abnormal; Notable for the following components:    Blood, UA POC Trace Hgb (*)     Leukocyte Est, UA POC TRACE (*)     All other components within normal limits   CBC WITH AUTO DIFFERENTIAL   LIPASE        XR LUMBAR SPINE (2-3 VIEWS)   Final Result   No acute osseous abnormality. Chronic and postoperative changes. Voice dictation software was used during the making of this note. This software is not perfect and grammatical and other typographical errors may be present. This note has not been completely proofread for errors.      Nael Pantoja PA-C  07/17/22 [id number]

## 2022-07-17 NOTE — ED NOTES
I have reviewed discharge instructions with the patient. The patient verbalized understanding. Patient left ED via Discharge Method: ambulatory to Home with spouse    Opportunity for questions and clarification provided. Patient given 3 scripts. To continue your aftercare when you leave the hospital, you may receive an automated call from our care team to check in on how you are doing. This is a free service and part of our promise to provide the best care and service to meet your aftercare needs.  If you have questions, or wish to unsubscribe from this service please call [phone]. Thank you for Choosing our [facility] Emergency Department.         Polina Campbell RN  07/17/22 [id number]

## (undated) DEVICE — REM POLYHESIVE ADULT PATIENT RETURN ELECTRODE: Brand: VALLEYLAB

## (undated) DEVICE — TRAY CATH 16F DRN BG LTX -- CONVERT TO ITEM 363158

## (undated) DEVICE — 3M™ TEGADERM™ TRANSPARENT FILM DRESSING FRAME STYLE, 1628, 6 IN X 8 IN (15 CM X 20 CM), 10/CT 8CT/CASE: Brand: 3M™ TEGADERM™

## (undated) DEVICE — SUTURE VCRL + SZ 3-0 L18IN ABSRB UD SH 1/2 CIR TAPERCUT NDL VCP864D

## (undated) DEVICE — TELFA NON-ADHERENT ABSORBENT DRESSING: Brand: TELFA

## (undated) DEVICE — BIPOLAR SEALER 23-112-1 AQM 6.0: Brand: AQUAMANTYS ®

## (undated) DEVICE — AMD ANTIMICROBIAL NON-ADHERENT PAD,0.2% POLYHEXAMETHYLENE BIGUANIDE HCI (PHMB): Brand: TELFA

## (undated) DEVICE — KENDALL SCD EXPRESS SLEEVES, KNEE LENGTH, MEDIUM: Brand: KENDALL SCD

## (undated) DEVICE — SUTURE VCRL VIO BR 0 18IN C/R M04 J701D

## (undated) DEVICE — JAM SHIDI: Brand: XIA PRECISION SYSTEM

## (undated) DEVICE — DRAIN SURG W7MMXL20CM SIL FULL PERF HUBLESS FLAT RADPQ STRP

## (undated) DEVICE — BLADE ASSEMB CLP HAIR FINE --

## (undated) DEVICE — AGENT HEMSTAT W3XL4IN OXIDIZED REGENERATED CELOS ABSRB FOR

## (undated) DEVICE — FLOSEAL HEMOSTATIC MATRIX, 5 ML: Brand: FLOSEAL

## (undated) DEVICE — Device

## (undated) DEVICE — DRAPE XR C ARM 41X74IN LF --

## (undated) DEVICE — SUT ETHLN 3-0 18IN PS1 BLK --

## (undated) DEVICE — MEDI-VAC NON-CONDUCTIVE SUCTION TUBING: Brand: CARDINAL HEALTH

## (undated) DEVICE — STERILE PACKAGE WITH CANNULA: Brand: LITE BIO DELIVERY SYSTEM

## (undated) DEVICE — 1010 S-DRAPE TOWEL DRAPE 10/BX: Brand: STERI-DRAPE™

## (undated) DEVICE — INTENDED FOR TISSUE SEPARATION, AND OTHER PROCEDURES THAT REQUIRE A SHARP SURGICAL BLADE TO PUNCTURE OR CUT.: Brand: BARD-PARKER ® STAINLESS STEEL BLADES

## (undated) DEVICE — SYR 10ML LUER LOK 1/5ML GRAD --

## (undated) DEVICE — WAX SURG 2.5GM HEMSTAT BNE BEESWAX PARAFFIN ISO PALMITATE

## (undated) DEVICE — (D)PREP SKN CHLRAPRP APPL 26ML -- CONVERT TO ITEM 371833

## (undated) DEVICE — 2000CC GUARDIAN II: Brand: GUARDIAN

## (undated) DEVICE — GOWN,PREVENTION PLUS,2XL,ST,22/CS: Brand: MEDLINE

## (undated) DEVICE — STANDARD HYPODERMIC NEEDLE,POLYPROPYLENE HUB: Brand: MONOJECT

## (undated) DEVICE — SOLUTION IV 1000ML 0.9% SOD CHL

## (undated) DEVICE — CARDINAL HEALTH FLEXIBLE LIGHT HANDLE COVER: Brand: CARDINAL HEALTH

## (undated) DEVICE — SURGIFOAM SPNG SZ 100

## (undated) DEVICE — PACK PROCEDURE SURG POST LAMINECTOMY CDS